# Patient Record
Sex: FEMALE | Race: BLACK OR AFRICAN AMERICAN | NOT HISPANIC OR LATINO | URBAN - METROPOLITAN AREA
[De-identification: names, ages, dates, MRNs, and addresses within clinical notes are randomized per-mention and may not be internally consistent; named-entity substitution may affect disease eponyms.]

---

## 2021-05-26 ENCOUNTER — TELEPHONE (OUTPATIENT)
Dept: BEHAVIORAL HEALTH | Facility: CLINIC | Age: 46
End: 2021-05-26

## 2021-05-26 NOTE — TELEPHONE ENCOUNTER
"Patient had filled out the PHQ-Q with a high score for suicidal thoughts on MyChart on 05/25/21 and this eval counselor had an appoientment for a gambling eval with her for 05/28/21.  Counselor reached out to patient to check on her suicidal ideation.  Patient reported having stolen from work last year, losing her job, losing her housing, being in a homeless shelter for months, and finally getting housing.  Patient has court approaching and her  recommended that she get into gambling treatment.  After talking to the patient, she is not actively suicidal, she would like to \"disappear\" yet reports many reasons to live including family, and hope for the future.  She has no prior record and has stolen an amount that she will be able to pay back as soon as she is able to maintain employment.  Discussion was held about going into inpatient and sober living.  She is worried she may lose her supported housing when they find out she has felony charges.  Patient and counselor talked about options and if she wanted to complete the eval earlier in the week, or wanted to have help getting directly into inpatient.  Patient stated she would check in in the morning.  This morning patient checked in stating that she will keep the appointment for Friday.  Patient was emailed information on [facility] and [facility] inpatient program.  Confirmed appointment for Friday and invited her to reach out if any questions or concerns surfaced before the appointment.    Emily Crowley MS, [facility], [facility]  "

## 2021-05-29 ENCOUNTER — HEALTH MAINTENANCE LETTER (OUTPATIENT)
Age: 46
End: 2021-05-29

## 2021-06-01 ENCOUNTER — TELEPHONE (OUTPATIENT)
Dept: BEHAVIORAL HEALTH | Facility: CLINIC | Age: 46
End: 2021-06-01

## 2021-06-01 NOTE — TELEPHONE ENCOUNTER
[facility] PHQ-9 Follow-up  Behavioral Health Clinician Triage Service    [employer] PHQ-9 Responses:  [facility] Follow-up to PHQ 5/25/2021 5/25/2021   PHQ-9 9. Suicide Ideation past 2 weeks More than half the days More than half the days   Thoughts of suicide or self harm in past 2 weeks - No   Thoughts of suicide or self harm in past 2 weeks No No   PHQ-9 Safety concerns? - No   PHQ-9 Safety concerns? No No        1st Outreach Date: June 1, 2021 Time: 250 pm  Outcome: Completed phone conversation / triage service.  See assessment and disposition below.  YasmeenDarrel)Keyla,[facility]     I m calling from [facility] to follow-up on a questionnaire you completed on [employer].      If it s ok I d like review a few of your symptoms/responses and a talk briefly  about how you have been doing to see if I might be able to provide some support and guidance.       Patient reports she is feeling better and is not experiencing suicidal thoughts. She also reports she was feeling  hopelessness rather than actually wanting to end her life. This was due to being unemployed. Patient is in contact with her therapist and has discussed this. She is also aware of safety resources. Patient reports canceling appointment with therapist after talking to her because of job interview and being hired. She has rescheduled this appointment for 6/3/2021.  She will let her therapist know if she experiences increased symptoms that are unmanageable.   FayePooja)Keyla,[facility]    Address/Location of patient: did not review  Have any supportive people near them? yes    Risk Assessment:  Patient has had a history of suicidal ideation: Patient reports history of passive thoughts  Patient denies current or recent suicidal ideation or behaviors.  Patient denies current or recent homicidal ideation or behaviors.  Patient denies current or recent self injurious behavior or ideation.  Patient denies other safety concerns.  Patient reports there are  no firearms in the house  Protective Factors Life Satisfaction, Reality testing ability and Positive coping skills   Risk Factors Current high stress    ASQ Assessment:  1. In the past few weeks, have you wished you were dead?  Yes: passive thoughts only - would not do anything to harm self. Just did not want to keep living her life  2. In the past few weeks, have you felt that you or your family would be better off if you         were dead?  Yes:  passive thoughts only - would not do anything to harm self. Just did not want to keep living her life

## 2021-06-02 ASSESSMENT — PATIENT HEALTH QUESTIONNAIRE - PHQ9
SUM OF ALL RESPONSES TO PHQ QUESTIONS 1-9: 12
10. IF YOU CHECKED OFF ANY PROBLEMS, HOW DIFFICULT HAVE THESE PROBLEMS MADE IT FOR YOU TO DO YOUR WORK, TAKE CARE OF THINGS AT HOME, OR GET ALONG WITH OTHER PEOPLE: SOMEWHAT DIFFICULT
SUM OF ALL RESPONSES TO PHQ QUESTIONS 1-9: 12

## 2021-06-03 ENCOUNTER — HOSPITAL ENCOUNTER (OUTPATIENT)
Dept: BEHAVIORAL HEALTH | Facility: CLINIC | Age: 46
Discharge: HOME | End: 2021-06-03
Attending: FAMILY MEDICINE | Admitting: FAMILY MEDICINE
Payer: MEDICAID

## 2021-06-03 PROCEDURE — 90791 PSYCH DIAGNOSTIC EVALUATION: CPT | Mod: GT | Performed by: COUNSELOR

## 2021-06-03 ASSESSMENT — ANXIETY QUESTIONNAIRES
4. TROUBLE RELAXING: MORE THAN HALF THE DAYS
7. FEELING AFRAID AS IF SOMETHING AWFUL MIGHT HAPPEN: NOT AT ALL
1. FEELING NERVOUS, ANXIOUS, OR ON EDGE: SEVERAL DAYS
5. BEING SO RESTLESS THAT IT IS HARD TO SIT STILL: SEVERAL DAYS
IF YOU CHECKED OFF ANY PROBLEMS ON THIS QUESTIONNAIRE, HOW DIFFICULT HAVE THESE PROBLEMS MADE IT FOR YOU TO DO YOUR WORK, TAKE CARE OF THINGS AT HOME, OR GET ALONG WITH OTHER PEOPLE: SOMEWHAT DIFFICULT
6. BECOMING EASILY ANNOYED OR IRRITABLE: SEVERAL DAYS
GAD7 TOTAL SCORE: 8
3. WORRYING TOO MUCH ABOUT DIFFERENT THINGS: SEVERAL DAYS
2. NOT BEING ABLE TO STOP OR CONTROL WORRYING: MORE THAN HALF THE DAYS

## 2021-06-03 NOTE — PROGRESS NOTES
"[facility] Services                                             SAFETY PLAN:  Step 1: Warning signs / cues (Thoughts, images, mood, situation, behavior) that a crisis may be developing:    Thoughts: I put myself down \"stupid\", \"not worth it\".    Thinking Processes: ruminations (can't stop thinking about my problems): stuck on past and racing thoughts    Mood: worsening depression    Behaviors: isolating/withdrawing , using drugs, using alcohol and not taking care of myself    Situations: legal issues: current legal problems   Step 2: Coping strategies - Things I can do to take my mind off of my problems without contacting another person (relaxation technique, physical activity):    Distress Tolerance Strategies:  Listen to music, read a book    Physical Activities: go for a walk    Focus on helpful thoughts:  \"This is temporary\", remind myself of what is important to me: my family and my future and that life is good right now.  Step 3: People and social settings that provide distraction:   Name: Abner Phone: [phone]   Name: Mom Phone: [phone]   Name: Sister Phone: [phone]    park, Islam and support group (i.e. twelve-step)   Step 4: Remind myself of people and things that are important to me and worth living for:  For myself, my family  Step 5: Professionals or agencies I can contact during a crisis:    Suicide Prevention Lifeline: 4-361-518-TALK (8373)    Crisis Text Line: Text [place] to 379013  [place] Helpline:  1-476.767.8269    Call 911 or go to my nearest emergency department.   I helped develop this safety plan and agree to use it when needed.  I have been given a copy of this plan.      Client signature ________Completed over the phone due to Telehealth_________________________________________________________  Today s date:  06/03/21  Adapted from Safety Plan Template 2008 Peggy Mcintosh and Parish Lee is reprinted with the express permission of the authors.  No portion of the Safety Plan " Template may be reproduced without the express, written permission.  You can contact the authors at [email or web address] or [email or web address].

## 2021-06-03 NOTE — PROGRESS NOTES
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 for the last 30 days (0-10, 0 being no urge at all)? 5    What has helped you reduce your urges to padilla?    Per patient \"thinking about my future, thinking about the help I am going to get, and help me to live a normal life.\"      Dimension V Ratings   Relapse/Continued Use/Continued problem potential - The placing authority must use the criteria in Dimension V to determine a client s relapse, continued use, and continued problem potential.   RISK DESCRIPTIONS - Severity ratin (A) Client has minimal recognition and understanding of relapse and recidivism issues and displays moderate vulnerability for further substance use or mental health problems. (B) Client has some coping skills inconsistently applied.    REASONS SEVERITY WAS ASSIGNED - (What information did the person provide that indicates his or her understanding of relapse issues? What about the person s experience indicates how prone he or she is to relapse? What coping skills does the person have that decrease relapse potential?)      Patient displays limited understanding of addiction and relapse potential.  Patient denies attending any Gamblers Anonymous (GA) groups in the past consistently.  Patient denies attending any problem gambling treatment in the " past.  Patient appears to lack knowledge of the addictions cycle, insight into their personal relapse process along with warning signs and triggers.  Patient appears to lack insight into the effects gambling has had on their physical, emotional and mental health.  Patient appears to lack impulse control, gambling free coping skills and long-term maintenance skills.  The patient appears to be at risk for relapsing in gambling behaviors if they do not seek treatment services at this time. Patient may have undiagnosed mental health, which increases risk for relapse.           DIMENSION VI - Recovery Environment   Are you currently working or in school? Please explain.     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     How has gambling affected your work?    Missed work days, lost housing and cars.    How would you describe your current financial status?  Some money problems    Are you are having problems with unpaid bills, bankruptcy, IRS problems, etc.?    Yes, please explain: IRS payment plan    What is your approximate present gambling debt?    Restitution $[id number]    Describe a typical week for you i.e. work, leisure activities, socializing, etc.     Work, no [employer], homebody.     What percentage of time do you padilla alone?  With others?     alone    Are you currently in a significant relationship?     No    Sexual Orientation:     Heterosexual    Who do you live with?      NA    Do you have any children?      No    How many times have you been ?    None    Describe your current support system i.e. family, friends, sponsor, therapist, etc.?      Mom, sibling, brother [place].    Do you have any past or present legal charges?    Yes, please explain: Patient is in front of [facility] at [address] for stealing from her place of employment.    Do you have any obstacles that would prevent you from participating in treatment?    No    Do you  "pray, meditate, do yoga, attend AA/NA/GA or other spiritual practices?    Yes, please explain: pray a lot, will find a Restoration in the community.    How does your spirituality impact your recovery?      Per patient \"plays a big part, help and guidance, keep me on the right path\".    Please list any other problems, issues or concerns that could affect your recovery if not addressed?      NA    Dimension VI Ratings   Recovery environment - The placing authority must use the criteria in Dimension VI to determine a client s recovery environment.   RISK DESCRIPTIONS - Severity rating: 3 Client is not engaged in structured, meaningful activity and the client's peers, family, significant other, and living environment are unsupportive, or there is significant criminal justice system involvement.    REASONS SEVERITY WAS ASSIGNED - (What support does the person have for making changes? What structure/stability does the person have in his or her daily life that will increase the likelihood that changes can be sustained? What problems exist in the person s environment that will jeopardize getting/staying clean and sober?)     The patient reports living in supported housing at the time of assessment.  Patient reports living environment is supportive of recovery efforts.  Patient reports not being in a romantic relationship at this time.  The patient does reports having relationship conflict with family due to continued gambling. Patient appears to have limited external activities, leisure time, friends, or social groups outside of gambling.  The patient appears to lack adequate support in the community through 12-step meetings or other recovery based interactions at this time and appears to lack additional supports familiar with recovery.  Patient is employed full time beginning 05/04/21. Patient  Has current legal involvement at this time.                 Summary of Gambling Disorder Symptoms     Needs to padilla with increasing " "amount of money in order to achieve desired excitement.  Is restlessness or irritable when attempting to cut down or stop gambling.  Has made repeated unsuccessful efforts to cut down or stop gambling.  Is often preoccupied with gambling (e.g. having persistent thoughts of reliving past gambling experiences, handicapping or planning the next venture, thinking of ways to get money with which to padilla).  Often gambles when feeling distressed (e.g. feelings of helplessness, guilt, anxiety, depression).  After losing money gambling, individual often returned another day to get even. (\"chasing one's losses\")  Lies to conceal the extent of involvement with gambling.  Has jeopardized or lost a significant relationship, job, educational or career opportunity because of gambling.  Relies on others to provide money to relieve desperate financial situations caused by gambling (\"a bailout\")    Specify if:   Episodic:  Meeting diagnostic at more than one time point, with symptoms subsiding between periods of gambling disorder for at least several months.    Persistent:  Experiencing continuous symptoms, to meet diagnostic criteria for multiple years.    Specify if:   In early remission:  After full criteria for alcohol/drug use disorder were previously met, none of the criteria for alcohol/drug use disorder have been met for at least 3 months but for less than 12 months (with the exception that Criterion A4,  Craving or a strong desire or urge to use alcohol/drug  may be met).     In sustained remission:   After full criteria for alcohol use disorder were previously met, none of the criteria for alcohol/drug use disorder have been met at any time during a period of 12 months or longer (with the exception that Criterion A4,  Craving or strong desire or urge to use alcohol/drug  may be met).   Specify if:   This additional specifier is used if the individual is in an environment where access to alcohol is restricted.    Mild: " Presence of 4-5 symptoms    Moderate: Presence of 6-7 symptoms    Severe: Presence of 8 or more symptoms      Summary of Substance Abuse Disorder Symptoms     A problematic pattern of alcohol/drug use leading to clinically significant impairment or distress, as manifested by at least two of the following, occurring within a 12-month period:    NA    Specify if:   In early remission:  After full criteria for alcohol/drug use disorder were previously met, none of the criteria for alcohol/drug use disorder have been met for at least 3 months but for less than 12 months (with the exception that Criterion A4,  Craving or a strong desire or urge to use alcohol/drug  may be met).     In sustained remission:   After full criteria for alcohol use disorder were previously met, none of the criteria for alcohol/drug use disorder have been met at any time during a period of 12 months or longer (with the exception that Criterion A4,  Craving or strong desire or urge to use alcohol/drug  may be met).   Specify if:   This additional specifier is used if the individual is in an environment where access to alcohol is restricted.    Mild: Presence of 2-3 symptoms    Moderate: Presence of 4-5 symptoms    Severe: Presence of 6 or more symptoms    SOGS: 17 DSM-5: 9 CAGE-AID: 0 ADIEL-7: 8 PHQ-9: 16     Mental Status Assessment    Physical Appearance/Attire:  Appears stated age   Hygiene:  Comment: Started as a video eval, went to telephone unable to assess.  Eye Contact:  at examiner  Speech:  regular  Speech Volume:  regular  Speech Quality: fluid  Cognitive/Perceptual:  reality based  Cognition:  memory intact   Judgment:  intact  Insight:  intact  Orientation:  time, place, person and situation  Thought:  logical   Hallucinations:  none  General Behavioral Tone:  cooperative  Psychomotor Activity:  Started as a video eval, went to telephone unable to assess.  Gait:  Started as a video eval, went to telephone unable to assess.  Mood:   appropriate, sad, depressed and staff assessment of mood congruent with patient report  Affect:  congruence/appropriate      Vulnerable Adult Checklist for OUTPATIENTS     1.  Do you have a physical, emotional or mental infirmity or dysfunction?       No    2.  Does this issue impair your ability to provide for your own care without help, including providing yourself with food, shelter, clothing, healthcare or supervision?       No    3.  Because of this issue, I need assistance to protect myself from maltreatment by others.      No    Based on the above information:    This person is not a functional Vulnerable Adult according to [place] Statute 626.5572 subdivision 21.      Category Severity (ICD-10 Code / DSM 5 Code)   Gambling Disorder Severe  (F63.0) (312.31)     Lamar-Suicide Severity Rating Scale   Suicide Ideation   1.) Have you ever wished you were dead or that you could go to sleep and not wake up?     Lifetime:  Yes Past Month:  Yes   2.) Have you actually had any thoughts of killing yourself?   Lifetime:  Yes Past Month:  Yes   3.) Have you been thinking about how you might do this?     Lifetime:  Yes, Describe: believes it is a sin, looks to the future, her mom and her family.  Walking out into traffic. Past Month:  No   4.) Have you had these thoughts and had some intention of acting on them?     Lifetime:  No Past Month:  No   5.) Have you started to work out the details of how to kill yourself?   Lifetime:  Yes, Describe: walking out in traffic Past Month:  No   6.) Do you intend to carry out this plan?      Lifetime:  No Past Month:  No   Intensity of Ideation   Intensity of ideation (1 being least severe, 5 being most severe):     Lifetime:  4, when she first lost her job and was facing court.  Past Month:  NA   How often do you have these thoughts?  Less than once a week      When you have the thoughts how long do they last?  Fleeting - few seconds or minutes   Can you stop thinking about  killing yourself or wanting to die if you want to?  Yes, easily able to control thoughts   Are there things - anyone or anything (i.e. family, Hoahaoism, pain of death) that stopped you from wanting to die or acting on thoughts of suicide?  Protective factors definitely stopped you from attempting suicide   What sort of reasons did you have for thinking about wanting to die or killing yourself (ie end pain, stop how you were feeling, get attention or reaction, revenge)?  Completely to end or stop the pain (you couldn't go on living the way you were feeling)   Suicidal Behavior   (Suicide Attempt) - Have you made a suicide attempt?     Lifetime:  No Past Month:  No   Have you engaged in self-harm (non-suicidal self-injury)?  No   (Interrupted Attempt) - Has there been a time when you started to do something to end your life but someone or something stopped you before you actually did anything?  No   (Aborted or Self-Interrupted Attempt) - Has there been a time when you started to do something to try to end your life but you stopped yourself before you actually did anything?  No   (Preparatory Acts of Behavior) - Have you taken any steps towards making suicide attempt or preparing to kill yourself (such as collecting pills, getting a gun, giving valuables away or writing a suicide note)?  No   Actual Lethality/Medical Damage:  NA     2008  [employer]  Used with permission by Zoe Tamayo, PhD.       Guide to C-SSRS Risk Ratings   NO IDEATION:  with no active thoughts IDEATION: with a wish to die. IDEATION: with active thoughts. Risk Ratings   If Yes No No 0 - Very Low Risk   If NA Yes No 1 - Low Risk   If NA Yes Yes 2 - Low/moderate risk   IDEATION: associated thoughts of methods without intent or plan INTENT: Intent to follow through on suicide PLAN: Plan to follow through on suicide Risk Ratings cont...   If Yes No No 3 - Moderate Risk   If Yes Yes No 4 - High Risk   If Yes Yes  "Yes 5 - High Risk   The patient's ADDITIONAL RISK FACTORS and lack of PROTECTIVE FACTORS may increase their overall suicide risk ratings.     Additional Risk Factors:    No additional risk factors   Protective Factors:    Having people in his/her life that would prevent the patient from considering a suicide attempt (i.e. young children, spouse, parents, etc.)     An absence of mental health issues or stable and well treated mental health issues     An absence of chronic health problems or stable and well treated chronic health issues     Having easy access to supportive family members     Having cultural, Sabianism or spiritual beliefs that discourage suicide     Having restricted access to highly lethal means of suicide     Risk Status   0. - Very Low Risk:  Evaluation Counselors:  Document in Epic / SBAR to counselor \"Very Low Risk\".      Treatment Counselors:  Reassess upon admission as applicable, assess weekly in progress notes under Dimension 3 and summarize in Discharge / Treatment summary under Dimension 3.   Additional information to support suicide risk rating: There was no additional information to provide at this time.     Summary of ASAM Placement Criteria:   I.) Intoxication and Withdrawal: 0   II.) Biomedical:  0   III.) Emotional and Behavioral:  2   IV.) Readiness to Change:  1   V.) Relapse Potential: 2   VI.) Recovery Environmental: 3     Evaluation Summary and Plan   's Recommendation    Abstain from all forms of gambling, including \"free\" games , and online/terrence games.  Refrain from entering all types of liv establishments.  Self-ban with the help of a trusted other from all local casinos/card rooms, cut up players cards and have all gambling mail stopped.  Abstain from all mood-altering chemicals unless prescribed by a licensed provider.  Complete the evening outpatient compulsive gambling treatment program at [facility].   Complete Orientation and begin group on " 06/08/21.  Attend GA 12-step, cultural, spiritual, other supportive community meeting on a weekly basis.   Have someone you check in with weekly who will hold you accountable.   Remain law abiding and follow all recommendations of the courts/PO.  Limit, if not abstain from alcohol and all other non-prescribed mood altering chemicals  Once insured complete all medical and dental appointments, consider meeting with a therapist for possible medication and ongoing therapy to talk about past childhood trauma.    At the beginning of the assessment patient was offered information on inpatient treatment, other outpatient options, individual counselors, and [facility].  Patient chose to attend [facility] as it worked for their schedule and has a family component.   has a financial interest in patient attending the [facility] program.  's clinical opinion is that the patient would benefit from group therapy.  Patient was also referred to inpatient at [facility] and will be considering attending in the future if therapeutically necessary.    Initial problem list:    The patient lacks relapse prevention skills  The patient has poor coping skills  The patient has poor refusal skills   The patient lacks a sober peer support network  The patient has a tendency to isolate  The patient has a significant history of trauma and/or abuse issues  The patient has current legal issues    Strengths:  Family support  Stable Employment  Stable housing  Connection to social and/or recovery support  Intelligent  Functional communication skills      Answers for HPI/ROS submitted by the patient on 6/2/2021   If you checked off any problems, how difficult have these problems made it for you to do your work, take care of things at home, or get along with other people?: Somewhat difficult  PHQ9 TOTAL SCORE: 12    Emily Crowley, MS, LADC, ICGC-II

## 2021-06-04 ASSESSMENT — ANXIETY QUESTIONNAIRES: GAD7 TOTAL SCORE: 8

## 2021-06-08 PROBLEM — Z72.6 GAMBLING PROBLEM: Status: ACTIVE | Noted: 2021-06-08

## 2021-06-10 ENCOUNTER — HOSPITAL ENCOUNTER (OUTPATIENT)
Dept: BEHAVIORAL HEALTH | Facility: CLINIC | Age: 46
End: 2021-06-10
Attending: FAMILY MEDICINE
Payer: MEDICAID

## 2021-06-10 PROCEDURE — 90832 PSYTX W PT 30 MINUTES: CPT | Mod: GT

## 2021-06-10 PROCEDURE — 90853 GROUP PSYCHOTHERAPY: CPT | Mod: GT

## 2021-06-11 NOTE — PROGRESS NOTES
Individual counseling session/ group orientation:    Telemedicine Visit: The patient's condition can be safely assessed and treated via synchronous audio and visual telemedicine encounter.      Reason for Telemedicine Visit: Services only offered telehealth    Originating Site (Patient Location): Patient's home    Distant Site (Provider Location): Provider Remote Setting    Consent:  The patient/guardian has verbally consented to: the potential risks and benefits of telemedicine (video visit) versus in person care; bill my insurance or make self-payment for services provided; and responsibility for payment of non-covered services.     Mode of Communication:  Video Conference via [employer]    As the provider I attest to compliance with applicable laws and regulations related to telemedicine.    Video visit start time: 4:12 PM  Video visit end time: 4:47 PM    Time: 35 minutes      D.  Patient attended Problem Gambling OP orientation.  Patient had received their assignment notebook in the mail, folder was gone through and discussed, patient agreed verbally to the Admission Orientation Checklist, due to Covid, not signed in person but gave verbal agreement.  PT reported wanting to try outpatient and may consider inpatient treatment at [facility] in the future.  PT denied any suicide and/or self harm ideation, plans or attempts at this time.  PT created safety plan during evaluation, PT was emailed copy of safety plan.  PT reported feeling safe.  PT will get information for sister and talk with mom for family group. PT was also sent information on in-person session, gamban and individual treatment providers and PT identified wanting to gain a therapist for individual session. PT scheduled individual treatment planning session with writer for Miguelina 15 @ 1:00 PM.  I: Counselor facilitated 1:1 orientation session.  A. Pt understood orientation and motivated to begin treatment.    P.  Begin attending group and meet  with counselor for individual treatment planning session.    Kristi Camarillo, VANDANA, CGC

## 2021-06-11 NOTE — PROGRESS NOTES
Group Therapy Documentation     Was the patient seen in-person or via Telemedicine (if in-person skip to Group Note): Telemedicine    If Telemedicine: The patient's condition can be safely assessed and treated via synchronous audio and visual telemedicine encounter. ? ?      Reason for Telemedicine Visit:? Due to Covid, only offering telehealth at this time.    Originating Site (Patient Location):  Home    Distant Site (Provider Location): Counselor off site    Consent: ?The patient/guardian has verbally consented to: the potential risks and benefits of telemedicine (video visit) versus in person care; bill my insurance or make self-payment for services provided; and responsibility for payment of non-covered services.       Mode of Communication:??Video Conference via Zoom      As the provider I attest to compliance with applicable laws and regulations related to telemedicine.        Patient attended a video group session on the following days: ?          GROUP NOTE:  DATE OF SERVICE:  Miguelina 10, 2021    START TIME:  5:30pm    END TIME:  7:31 PM    FACILITATOR(S):    [place]DA[place] [facility] [place]IC: BEH Group Therapy, Problem Gambling Group     Number of patients attending the group: 11    Group Length:   121 minutes    Group Therapy Type:  Problem Gambling Group    Group Attendance:  Client attended group     Summary of Group / Topics Discussed: Les RAI From phase III peer led group joined and shared aftercare option with phase III.  PT's asked questions from Les regarding programming and recovery.  Group followed up with group introductions, with 2 new members of the group, and group members welcomed new members to the group.  Group discussed various aspects of recovery, spirituality, trust, honesty, family and friends in discussion of how their addiction and recovery has impacted these and how they would like to implement them into their recovery.     Patient's response to the group  topic/interactions:  PT appeared to gain information and interest in Phase III.  PT also appeared to gain insight into reimplementing important aspects of their lives into recovery.      Client specific details:   PT attended her first group and asked questions of Phase III presenter and introduced herself to the group.  PT shared she is facing felony charges for stealing.  PT participated in group discussion, sharing how gambling has impacted her relationships with both friends and family.  PT was an active group member.

## 2021-06-15 ENCOUNTER — HOSPITAL ENCOUNTER (OUTPATIENT)
Dept: BEHAVIORAL HEALTH | Facility: CLINIC | Age: 46
End: 2021-06-15
Attending: FAMILY MEDICINE
Payer: MEDICAID

## 2021-06-15 PROCEDURE — 90837 PSYTX W PT 60 MINUTES: CPT | Mod: GT

## 2021-06-15 NOTE — PROGRESS NOTES
Individual counseling session:    Telemedicine Visit: The patient's condition can be safely assessed and treated via synchronous audio and visual telemedicine encounter.      Reason for Telemedicine Visit: Services only offered telehealth    Originating Site (Patient Location): Patient's home    Distant Site (Provider Location): Provider Remote Setting    Consent:  The patient/guardian has verbally consented to: the potential risks and benefits of telemedicine (video visit) versus in person care; bill my insurance or make self-payment for services provided; and responsibility for payment of non-covered services.     Mode of Communication:  Video Conference via [employer]    As the provider I attest to compliance with applicable laws and regulations related to telemedicine.    Video visit start time: 1:00 pm  Video visit end time: 1:55 PM    D) Pt met with Problem Gambling counseling staff via video conference to create treatment plan. PT reported feeling motivated for recovery efforts.  PT has agreed to gain a mental health therapist for ongoing support and was sent list of treatment providers.  PT denied any suicidal ideations, plan or attempts, reporting feeling better and motivated.  PT will talk to family members about attending family programming.    Client Treatment goal list:  1. Have the ability to identify and control triggers and urges to padilla.  2. Develop copings skills  3. Rebuild relationships with family and friends.    I: Counselor facilitated 1:1 treatment planning session  A: Pt agreed to treatment plan and appeared motivated to begin working on problems identified and discussed. Treatment goals include all Dimensions. Pt understood orientation and motivated to begin treatment.   Patient agreed verbally to the Admission Orientation Checklist, due to Covid, not signed in person but gave verbal agreement.   P) Pt to continue in group and begin working on assignments.  Counselor will send  additional homework assignments as indicated in PT treatment plan.  PT is scheduled for individual counseling session with writer on Tuesday, June 22 @ 10:00 AM.

## 2021-06-16 ENCOUNTER — HOSPITAL ENCOUNTER (OUTPATIENT)
Dept: BEHAVIORAL HEALTH | Facility: CLINIC | Age: 46
End: 2021-06-16
Attending: FAMILY MEDICINE
Payer: MEDICAID

## 2021-06-16 PROCEDURE — 90853 GROUP PSYCHOTHERAPY: CPT | Mod: GT

## 2021-06-17 ENCOUNTER — HOSPITAL ENCOUNTER (OUTPATIENT)
Dept: BEHAVIORAL HEALTH | Facility: CLINIC | Age: 46
End: 2021-06-17
Attending: FAMILY MEDICINE
Payer: MEDICAID

## 2021-06-17 PROCEDURE — 90853 GROUP PSYCHOTHERAPY: CPT | Mod: GT

## 2021-06-17 NOTE — PROGRESS NOTES
Family  Group Documentation     Was the patient seen in-person or via Telemedicine (if in-person skip to Group Note): Telemedicine    If Telemedicine: The patient's condition can be safely assessed and treated via synchronous audio and visual telemedicine encounter. ? ?      Reason for Telemedicine Visit:? Group only offered via telemedicine.    Originating Site (Patient Location):  Home    Distant Site (Provider Location): [facility]      Consent: ?The patient/guardian has verbally consented to: the potential risks and benefits of telemedicine (video visit) versus in person care; bill my insurance or make self-payment for services provided; and responsibility for payment of non-covered services.       Mode of Communication:??Video Conference via Zoom      As the provider I attest to compliance with applicable laws and regulations related to telemedicine.        Patient attended a video group session on the following days: ?          GROUP NOTE:  DATE OF SERVICE:  June 16, 2021    START TIME:  5:30 PM    END TIME:  7:07 PM    FACILITATOR(S):  VANDANA Lord, [facility] [place]    TOPIC: BEH Group Therapy, Problem Gambling Group - Family    Number of patients attending the group: 7      In-person: 0      Video Conference: 7  2 family members were also in attendance via video conference.  One PT joined group at 6:30 PM.    Group Length:   97 minutes    Group Therapy Type:  Problem Gambling Family Group    Group Attendance:  Client attended group     Summary of Group / Topics Discussed: Group checked in with introductions discussed various aspects of healthy and unhealthy relationships with discussions on characteristics on codependency, enabling behaviors within addiction, in addition to identifying and building healthy boundaries.  Boundaries included emotional, spiritual, financial and physical.  Group members shared their experiences the various topics discussed.     Patient's response to  the group topic/interactions:  PT appeared to gain insight into understanding codependency and enabling behaviors, as well as       Client specific details:  PT shared she is feeling accomplished as she had court today.  PT shared in group discussion and is an active participant in group and treatment processes.

## 2021-06-18 NOTE — PROGRESS NOTES
Group Therapy Documentation      Was the patient seen in-person or via Telemedicine (if in-person skip to Group Note): Telemedicine     If Telemedicine: The patient's condition can be safely assessed and treated via synchronous audio and visual telemedicine encounter. ? ?       Reason for Telemedicine Visit:? Due to Covid, only offering telehealth at this time.     Originating Site (Patient Location):  Home     Distant Site (Provider Location): Counselor off site     Consent: ?The patient/guardian has verbally consented to: the potential risks and benefits of telemedicine (video visit) versus in person care; bill my insurance or make self-payment for services provided; and responsibility for payment of non-covered services.        Mode of Communication:??Video Conference via Zoom      As the provider I attest to compliance with applicable laws and regulations related to telemedicine.        Patient attended a video group session on the following days: ?           GROUP NOTE:  DATE OF SERVICE:  June 17, 2021     START TIME:  5:30pm     END TIME:  7:29 PM     FACILITATOR(S):    VANDANA Lord, [facility] [place]     TOPIC: BEH Group Therapy, Problem Gambling Group      Number of patients attending the group: 10     Group Length:   119 minutes     Group Therapy Type:  Problem Gambling Group     Group Attendance:  Client attended group      Summary of Group / Topics Discussed: Group checked in with how their week has been and efforts towards their recovery.  Counselor processed leave of absence from counseling staff and offered support and addressed questions and concerns.  Counselor also discussed option of Thursday attending in-person sessions.  Group discussed various recovery related topics such as self esteem, self worth, connection, family, feelings in early recovery, urges to padilla and cross addiction.  Also group discussed their struggles and accomplishments in recovery.     Patient's response to the group  topic/interactions:    PT shared their personal experience with the topics, adding to the discussion.       Client specific details:  PT shared her excitement with her brother, sister and mother all willing to participate in family programming.  PT contributed to group discussion.

## 2021-06-23 ENCOUNTER — HOSPITAL ENCOUNTER (OUTPATIENT)
Dept: BEHAVIORAL HEALTH | Facility: CLINIC | Age: 46
End: 2021-06-23
Attending: FAMILY MEDICINE
Payer: MEDICAID

## 2021-06-23 PROCEDURE — 90853 GROUP PSYCHOTHERAPY: CPT | Mod: GT

## 2021-06-23 PROCEDURE — 90837 PSYTX W PT 60 MINUTES: CPT | Mod: GT

## 2021-06-23 NOTE — PROGRESS NOTES
Individual counseling session:    Telemedicine Visit: The patient's condition can be safely assessed and treated via synchronous audio and visual telemedicine encounter.      Reason for Telemedicine Visit: Services only offered telehealth    Originating Site (Patient Location): Patient's home    Distant Site (Provider Location): Provider Remote Setting    Consent:  The patient/guardian has verbally consented to: the potential risks and benefits of telemedicine (video visit) versus in person care; bill my insurance or make self-payment for services provided; and responsibility for payment of non-covered services.     Mode of Communication:  Video Conference via [employer]    As the provider I attest to compliance with applicable laws and regulations related to telemedicine.    Video visit start time: 1:00 PM  Video visit end time: 2:06 PM    Time:  66 minutes    D: PT met with writer via video conference for scheduled individual counseling session.  PT reported experiencing urges over the weekend, reporting she was able to identify boredom.  PT reports her sister is currently in town on a work assignment, reporting she will begin spending additional time with her.  PT and counselor discussed codependency from group topic as PT reports this is something she would like to work on.  PT reported her sister would be going to [employer] with her this weekend for self exclusion.  PT reported attending court, reporting 3 years probation with a stay of judication.  PT will send writer probation information as treatment is indicated as a condition.  PT shared feelings of self doubt and judgement and reports she will continue to identify positive affirmations, identify areas she is proud of herself and begin journlling.  PT also reported she is going to work towards increasing her chavez. PT reported feeling positive in her recovery and reports she continues to build on self confidence to be more active in group sessions.  PT  also reported identifying being honest has been a positive experience in recovery.  PT signed INESSA's for her mother, sister and brother and will talk to them about attending family group next week.  I: Counselor facilitated 1:1 session.  A: PT appears engaged and motivated in recovery efforts.  P: Begin jouralling, attend group and begin working on homework assignments as indicated in treatment plan.

## 2021-06-24 NOTE — PROGRESS NOTES
"Family  Group Documentation      Was the patient seen in-person or via Telemedicine (if in-person skip to Group Note): Telemedicine     If Telemedicine: The patient's condition can be safely assessed and treated via synchronous audio and visual telemedicine encounter. ? ?       Reason for Telemedicine Visit:? Group only offered via telemedicine.     Originating Site (Patient Location):  Home     Distant Site (Provider Location): [facility]       Consent: ?The patient/guardian has verbally consented to: the potential risks and benefits of telemedicine (video visit) versus in person care; bill my insurance or make self-payment for services provided; and responsibility for payment of non-covered services.        Mode of Communication:??Video Conference via Zoom      As the provider I attest to compliance with applicable laws and regulations related to telemedicine.        Patient attended a video group session on the following days: ?           GROUP NOTE:  DATE OF SERVICE:  June 23, 2021     START TIME:  5:30 PM     END TIME:  7:18 PM     FACILITATOR(S):  VANDANA Lord, [facility] [place]     TOPIC: BEH Group Therapy, Problem Gambling Group - Family     Number of patients attending the group: 7      In-person: 0      Video Conference: 7  2 family members were also in attendance via video conference.     Group Length:   108 minutes     Group Therapy Type:  Problem Gambling Family Group     Group Attendance:  Client attended group      Summary of Group / Topics Discussed: Group checked in with introductions of group members and family.  Group discussed effective and relationships using Harsh Zapien's evidenced based theory, \"The Seven Principles for Making Marriage Work\" and applied these concepts to familial relationships, including siblings, parents, partners and other close family members.  Writer discussed negative communication patterns and overcoming them, in addition to aspects that make " up healthy relationships and connection with family based on the book.     Patient's response to the group topic/interactions:  PT appeared to gain insight into aspects of connection and characteristics of a healthy relationship.      Client specific details: PT checked in and opened up to the group sharing her feelings being in a group for the first time and not having the group and recovery experience as others in the group.  Peers offered support and their experiences with being in group for the first time.  PT shared she no longer felt alone.  PT participated in group discussion.

## 2021-06-29 ENCOUNTER — HOSPITAL ENCOUNTER (OUTPATIENT)
Dept: BEHAVIORAL HEALTH | Facility: CLINIC | Age: 46
End: 2021-06-29
Attending: FAMILY MEDICINE
Payer: MEDICAID

## 2021-06-29 PROCEDURE — 90837 PSYTX W PT 60 MINUTES: CPT | Mod: GT

## 2021-06-29 PROCEDURE — 90853 GROUP PSYCHOTHERAPY: CPT | Mod: GT | Performed by: COUNSELOR

## 2021-06-29 NOTE — PROGRESS NOTES
Individual counseling session:    Telemedicine Visit: The patient's condition can be safely assessed and treated via synchronous audio and visual telemedicine encounter.      Reason for Telemedicine Visit: Services only offered telehealth    Originating Site (Patient Location): Patient's home    Distant Site (Provider Location): Provider Remote Setting    Consent:  The patient/guardian has verbally consented to: the potential risks and benefits of telemedicine (video visit) versus in person care; bill my insurance or make self-payment for services provided; and responsibility for payment of non-covered services.     Mode of Communication:  Video Conference via [employer]    As the provider I attest to compliance with applicable laws and regulations related to telemedicine.    Counselor verified Patient with 2-point verification: PT is known to provider.    Video visit start time: 10:05 AM  Video visit end time: 11:09 AM    Length of session: 64 minutes    D: PT met with writer for scheduled individual counseling session.  PT reported spending time with her sister and learning how supportive her family members are for her.  PT reported she and her sister went to [employer] and self banned herself.  PT reported feeling empower.  PT reported she is building relationships with her family at this time and they received invite from this writer and will be attending family group on Wed.  PT also reports working with medical clinicians regarding sciatic and leg pain, reporting physical therapy beginning next week.  PT reported possible surgery for a lump on her back and will follow up with additional appointments next week.  PT and counselor discussed additional supports, including GA and attending inperson group to build on connections.  PT reported she will consider these options as she verbalizes they will support her recovery.  PT reported breaking up with a long term partner, reporting this as an unhealthy,  unsupportive relationship.  PT shared her feelings openly.  PT identified feelings of anger and frustration as well as boredom as triggers to padilla.  PT reported reaching out to her sister this weekend when she experienced urges.  PT denies any gambling.  PT reported wanting to make a strong effort to build self esteem and self worth in recovery.  PT and counselor also discussed leisure hobbies/activites due to PT currently not working.  PT reports she has seen some arts and crafts at the store and may explore engaging.  PT reported she would like to be more courageous and assertiveness in her recovery reporting having positivity and hope for her recovery and future.     I: Counselor facilitated 1:1 session.  A: PT appears motivated and engaged in her recovery.  P: PT will consider attending GA meetings (either virtually or in-person if transportation permits) and consider attending in person group sessions.  PT will also work to identify leisure activities being mindful of physical abilities.      PT is scheduled for 1:1 counseling session next week, Wed, July 7, 2021 @ 2:30.

## 2021-06-30 ENCOUNTER — HOSPITAL ENCOUNTER (OUTPATIENT)
Dept: BEHAVIORAL HEALTH | Facility: CLINIC | Age: 46
End: 2021-06-30
Attending: FAMILY MEDICINE
Payer: MEDICAID

## 2021-06-30 PROCEDURE — 90853 GROUP PSYCHOTHERAPY: CPT | Mod: GT

## 2021-06-30 NOTE — PROGRESS NOTES
Group Therapy Documentation      Was the patient seen in-person or via Telemedicine (if in-person skip to Group Note): Telemedicine     If Telemedicine: The patient's condition can be safely assessed and treated via synchronous audio and visual telemedicine encounter. ? ?       Reason for Telemedicine Visit:? Due to Covid, only offering telehealth at this time.     Originating Site (Patient Location):  Home     Distant Site (Provider Location): Counselor off site     Consent: ?The patient/guardian has verbally consented to: the potential risks and benefits of telemedicine (video visit) versus in person care; bill my insurance or make self-payment for services provided; and responsibility for payment of non-covered services.        Mode of Communication:??Video Conference via Zoom      As the provider I attest to compliance with applicable laws and regulations related to telemedicine.        Patient attended a video group session on the following days: ?           GROUP NOTE:  DATE OF SERVICE:  June 29, 2021     START TIME:  5:30pm     END TIME:  7:30pm     FACILITATOR(S):    Emily Crowley MS, VANDANA, [facility]     TOPIC: BEH Group Therapy, Problem Gambling Group      Number of patients attending the group: 13, one patient left after 1/2 hour and wasn't billed.     Group Length:   120 mins     Group Therapy Type:  Problem Gambling Group     Group Attendance:  Client attended group      Summary of Group / Topics Discussed: Group checked in after a three week unplanned absence of counseling staff. Patient shared how they were doing and what had been going on in the past weeks.  Many patient's mentioned stress, additional stressors in their life, or just constant stress.  After check in discussion was held on Meditation, free meditation apps, finding flow, mindfulness, coloring, and the basics of CBT. Patient's were asked to share one thing they do to de stress.  Group ended with a [employer] reading on Perceptiveness.       Patient's response to the group topic/interactions:  Patient was attentive in group and shared how they were doing during their check in.       Client specific details:   Patient shared that she feels really good about her life right now, being in group, and that she is being grateful for all that has gone well this year.  She stated she walks and listens to [employer] music to de-stress.

## 2021-07-01 ENCOUNTER — HOSPITAL ENCOUNTER (OUTPATIENT)
Dept: BEHAVIORAL HEALTH | Facility: CLINIC | Age: 46
End: 2021-07-01
Attending: FAMILY MEDICINE
Payer: COMMERCIAL

## 2021-07-01 PROCEDURE — 90853 GROUP PSYCHOTHERAPY: CPT | Mod: GT

## 2021-07-01 NOTE — PROGRESS NOTES
Family  Group Documentation     Was the patient seen in-person or via Telemedicine (if in-person skip to Group Note): Telemedicine    If Telemedicine: The patient's condition can be safely assessed and treated via synchronous audio and visual telemedicine encounter. ? ?      Reason for Telemedicine Visit:? Group only offered via telemedicine.    Originating Site (Patient Location):  Home    Distant Site (Provider Location): [facility]      Consent: ?The patient/guardian has verbally consented to: the potential risks and benefits of telemedicine (video visit) versus in person care; bill my insurance or make self-payment for services provided; and responsibility for payment of non-covered services.       Mode of Communication:??Video Conference via Zoom      As the provider I attest to compliance with applicable laws and regulations related to telemedicine.        Patient attended a video group session on the following days: ?          GROUP NOTE:  DATE OF SERVICE:  June 30, 2021    START TIME:  5:30 PM    END TIME:  7:13 PM    FACILITATOR(S):  VANDANA Lord, [facility]    TOPIC: BEH Group Therapy, Problem Gambling Group - Family    Number of patients attending the group: 4      In-person: 0      Video Conference: 4  1 family members were also in attendance via video conference.     Group Length:   103 minutes    Group Therapy Type:  Problem Gambling Family Group    Group Attendance:  Client attended group     Summary of Group / Topics Discussed: Group checked in with introductions.  Group discussion was held on the biology of change, in addition, identifying the underlying feelings associated, including how to re-wire neurons in our brain with healthy thoughts, patterns and actions.  At the end of group, new group member introduced himself and was welcomed by group members.      Patient's response to the group topic/interactions:  PT appeared to gain insight into change and how  implementing healthy thoughts and behaviors into recovery, including implementing positivity.      Client specific details: PT shared and participated in group discussion.

## 2021-07-02 NOTE — PROGRESS NOTES
Group Therapy Documentation     Was the patient seen in-person or via Telemedicine (if in-person skip to Group Note): Telemedicine    If Telemedicine: The patient's condition can be safely assessed and treated via synchronous audio and visual telemedicine encounter. ? ?      Reason for Telemedicine Visit:? Due to Covid, only offering telehealth at this time.    Originating Site (Patient Location):  Home    Distant Site (Provider Location): Counselor off site    Consent: ?The patient/guardian has verbally consented to: the potential risks and benefits of telemedicine (video visit) versus in person care; bill my insurance or make self-payment for services provided; and responsibility for payment of non-covered services.       Mode of Communication:??Video Conference via Zoom      As the provider I attest to compliance with applicable laws and regulations related to telemedicine.        Patient attended a video group session on the following days: ?          GROUP NOTE:  DATE OF SERVICE:  July 1, 2021    START TIME:  5:30pm    END TIME:  7:28 PM    FACILITATOR(S):   VANDANA [place], [facility] [place]    TOPIC: BEH Group Therapy, Problem Gambling Group     Number of patients attending the group: 14    Group Length:   118 minutes    Group Therapy Type:  Problem Gambling Group    Group Attendance:  Client attended group     Summary of Group / Topics Discussed: Group began with check in and discussed the 4th of July weekend as possible trigger.  Group discussed experiencing/trying new things in the past 2 weeks, sharing if they had.  Discussion we held on distorted thinking patterns such as all-or-nothing thinking, judging, Emotional Reasoning, Overgeneralizing, and discounting the negative and blaming.  PT's shared their experiences and those they identified with.  One PT shared her experience of challenging her own negative thoughts and replacing them with positive things using Cognitive techniques.  Counselor discussed  change and how to 'rewire' our brains to develop healthy thinking patterns, in addition to self compassion.  PT's encouraged to begin acknowledging their own personal cognitive distortions/negative thinking patterns and challenging these beliefs.     Patient's response to the group topic/interactions:  PT appeared to gain knowledge of negative thinking patterns and gain insight into personal negative thinking patterns.      Client specific details:  PT shared she is excited her family will be joining family group next week.
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
## 2021-07-02 NOTE — ADDENDUM NOTE
Encounter addended by: Kristi [facility]illo [facility] on: 7/2/2021 8:34 AM   Actions taken: Clinical Note Signed

## 2021-07-06 ENCOUNTER — TELEPHONE (OUTPATIENT)
Dept: BEHAVIORAL HEALTH | Facility: CLINIC | Age: 46
End: 2021-07-06

## 2021-07-06 NOTE — TELEPHONE ENCOUNTER
Patient emailed to say she would not be at group this evening due to running errands with her sister before her mother comes into town.  She said she will be in group the rest of the week.     Emily Crowley MS, LADC, ICGC-II

## 2021-07-07 ENCOUNTER — HOSPITAL ENCOUNTER (OUTPATIENT)
Dept: BEHAVIORAL HEALTH | Facility: CLINIC | Age: 46
End: 2021-07-07
Attending: FAMILY MEDICINE
Payer: COMMERCIAL

## 2021-07-07 PROCEDURE — 90853 GROUP PSYCHOTHERAPY: CPT | Mod: GT

## 2021-07-07 PROCEDURE — 90837 PSYTX W PT 60 MINUTES: CPT | Mod: GT

## 2021-07-07 NOTE — PROGRESS NOTES
Individual counseling session:    Telemedicine Visit: The patient's condition can be safely assessed and treated via synchronous audio and visual telemedicine encounter.      Reason for Telemedicine Visit: Services only offered telehealth    Originating Site (Patient Location): Patient's home    Distant Site (Provider Location): Provider Remote Setting    Consent:  The patient/guardian has verbally consented to: the potential risks and benefits of telemedicine (video visit) versus in person care; bill my insurance or make self-payment for services provided; and responsibility for payment of non-covered services.     Mode of Communication:  Video Conference via [employer]    As the provider I attest to compliance with applicable laws and regulations related to telemedicine.    Patient is known to provider.    Video visit start time: 2:30 PM  Video visit end time: 3:34 PM    Length of session: 64 minutes    D: Writer met with PT for scheduled individual counseling session.  PT reported increased anxiety do to her mom visiting from out of town for 11 days and will be staying in PT's home.  PT reports she hasn't seen her mother in about a year.  Counselor and PT discussed strategies to manage increased emotions and implement self care, including journalling, taking time outs to read, walk, yoga, breathing exorcises and/or light exercise being mindful of physical limitations due to leg pain.  PT reported seeing her family and building relationships has been positive and she reports her family as supportive of her recovery.  PT reports she is also working to be more assertive with her emotions and sharing them with her family members.  PT reported spending quality time with her sister as she is in town on a work assignment. PT reported she continues to change her negative thinking patterns and has begun to journal positive affirmations also.  PT reports she has become aware of her increased positivity towards self.  I:  Counselor facilitated 1:1 session.  A: PT appears engaged and motivated in her recovery.  P: PT to continue journalling emotions, positive affirmations, and continue to work towards treatment plan goals.

## 2021-07-08 NOTE — PROGRESS NOTES
Family  Group Documentation     Was the patient seen in-person or via Telemedicine (if in-person skip to Group Note): Telemedicine    If Telemedicine: The patient's condition can be safely assessed and treated via synchronous audio and visual telemedicine encounter. ? ?      Reason for Telemedicine Visit:? Group only offered via telemedicine.    Originating Site (Patient Location):  Home    Distant Site (Provider Location): [facility]      Consent: ?The patient/guardian has verbally consented to: the potential risks and benefits of telemedicine (video visit) versus in person care; bill my insurance or make self-payment for services provided; and responsibility for payment of non-covered services.       Mode of Communication:??Video Conference via Zoom      As the provider I attest to compliance with applicable laws and regulations related to telemedicine.        Patient attended a video group session on the following days: ?          GROUP NOTE:  DATE OF SERVICE:  July 7, 2021    START TIME:  5:30 PM    END TIME:  7:07 PM    FACILITATOR(S):  VANDANA Lord, [place] [facility] [place]    TOPIC: BEH Group Therapy, Problem Gambling Group - Family    Number of patients attending the group: 7      In-person: 0      Video Conference: 7  *1 group member joined at 6:02  * 1 group member joined at 6:05  * 1 group member joined at 6:18     1 family members were also in attendance via video conference.    Group Length:   97 minutes    Group Therapy Type:  Problem Gambling Family Group    Group Attendance:  Client attended group     Summary of Group / Topics Discussed: Jv-Anon member joined group and shared their story of how gambling has impacted them and their family.  Guest speaker shared her journey and the the benefits of Jv-Anon as well as additional resources such as using [facility] jairo for counseling for families.  Following, group discussed the impact of gambling on families such as financial,  emotional and the isolation and disconnection from families addiction causes.  Group discussed how to rebuild trust and connections with others. (DM 4)     Patient's response to the group topic/interactions: PT's shared how they benefited from hearing the speaker's story and were able to give feedback and ask questions and how they were able to relate with their families.      Client specific details: PT shared in group discussion with her brother joining the group.  PT's brother shared his feelings of helplessness with attempts to help his sister in the past and PT shared how she attempted to keep her gambling behaviors a secret.  Both PT and her brother shared in group discussion following on rebuilding connections.

## 2021-07-13 ENCOUNTER — HOSPITAL ENCOUNTER (OUTPATIENT)
Dept: BEHAVIORAL HEALTH | Facility: CLINIC | Age: 46
End: 2021-07-13
Attending: FAMILY MEDICINE
Payer: COMMERCIAL

## 2021-07-13 PROCEDURE — 90853 GROUP PSYCHOTHERAPY: CPT | Mod: GT | Performed by: COUNSELOR

## 2021-07-14 ENCOUNTER — HOSPITAL ENCOUNTER (OUTPATIENT)
Dept: BEHAVIORAL HEALTH | Facility: CLINIC | Age: 46
End: 2021-07-14
Attending: FAMILY MEDICINE
Payer: COMMERCIAL

## 2021-07-14 PROCEDURE — 90837 PSYTX W PT 60 MINUTES: CPT | Mod: GT | Performed by: COUNSELOR

## 2021-07-14 NOTE — PROGRESS NOTES
Individual counseling session:    Telemedicine Visit: The patient's condition can be safely assessed and treated via synchronous audio and visual telemedicine encounter.      Reason for Telemedicine Visit: Services only offered telehealth    Originating Site (Patient Location): Patient's home    Distant Site (Provider Location): Provider Remote Setting    Consent:  The patient/guardian has verbally consented to: the potential risks and benefits of telemedicine (video visit) versus in person care; bill my insurance or make self-payment for services provided; and responsibility for payment of non-covered services.     Mode of Communication:  Video Conference via [employer]    As the provider I attest to compliance with applicable laws and regulations related to telemedicine.    Counselor verified Patient with 2-point verification: Patient is known to provider.    Video visit start time: 9:59am, patient lost audio, went to a phone call until the end of the visit  Video visit end time: 10:53am    Length of session: 54 mins    D: Patient reported in group last night that her mother and sister had come into town and her mother ended up in the hospital all weekend.  She is concerned about the prognosis, so was offered a 1:1 session in the morning.  Patient met with counselor for a 1:1 session to discuss how she was doing with her mother's illness. She reports being very thankful that her sister, who is a traveling nurse, was also in town to help with her mother. Patient feels guilt for not being able to help her mother due to her past gambling.    I: Counselor facilitated 1:1 session.  A: Patient reported feeling guilt and sadness over her mothers illness, patient reported feeling better being able to talk through her feelings.  P: Patient reached out to her family to talk about her feelings. Patient was encouraged to attend in person group and GA, build her recovery support.  Patient is struggling to find a job and will  be losing her unemployment soon.  Patient will reach out to counselors if she needs an individual session, patient is meeting with her regular weekly individual with the other group counselor on Friday.    Emily Crowley MS, LADC, ICGC-II

## 2021-07-14 NOTE — PROGRESS NOTES
Group Therapy Documentation     Was the patient seen in-person or via Telemedicine (if in-person skip to Group Note): Telemedicine    If Telemedicine: The patient's condition can be safely assessed and treated via synchronous audio and visual telemedicine encounter. ? ?      Reason for Telemedicine Visit:? Due to Covid, only offering telehealth at this time.    Originating Site (Patient Location):  Home    Distant Site (Provider Location): Counselor off site    Consent: ?The patient/guardian has verbally consented to: the potential risks and benefits of telemedicine (video visit) versus in person care; bill my insurance or make self-payment for services provided; and responsibility for payment of non-covered services.       Mode of Communication:??Video Conference via Zoom      As the provider I attest to compliance with applicable laws and regulations related to telemedicine.        Patient attended a video group session on the following days: ?          GROUP NOTE:  DATE OF SERVICE:  July 13, 2021    START TIME:  5:30pm    END TIME:  7:30pm    FACILITATOR(S):    Emily Crowley [facility], [facility], [facility]    TOPIC: BEH Group Therapy, Problem Gambling Group     Number of patients attending the group: 12    Group Length:   120 mins    Group Therapy Type:  Problem Gambling Group    Group Attendance:  Client attended group     Summary of Group / Topics Discussed: GA speaker Bill came to group to share about Gamblers Anonymous, how the 12 steps work, how he benefited from the 12 steps after completing outpatient, and how he continues to benefit now that he is 5+ years clean from gambling.  The rest of the time was spent talking about patient billing and the jairo as some of the patients have been receiving bills.  Also a couple the group members are nearing completion and their outdates were discussed.      Patient's response to the group topic/interactions:  Patient benefited from learning more about ongoing supports in the  community to support ongoing recovery from problem gambling.       Client specific details:   Patient shared that her mom had come into town and ended up in the hospital all weekend.  Patient and counselor set up a time to talk tomorrow morning as patient seemed to need to process what had happened and the news she received about the prognosis.

## 2021-07-16 ENCOUNTER — HOSPITAL ENCOUNTER (OUTPATIENT)
Dept: BEHAVIORAL HEALTH | Facility: CLINIC | Age: 46
End: 2021-07-16
Attending: FAMILY MEDICINE
Payer: COMMERCIAL

## 2021-07-16 PROCEDURE — 90837 PSYTX W PT 60 MINUTES: CPT | Mod: GT

## 2021-07-16 NOTE — PROGRESS NOTES
Individual counseling session:    Telemedicine Visit: The patient's condition can be safely assessed and treated via synchronous audio and visual telemedicine encounter.      Reason for Telemedicine Visit: Services only offered telehealth    Originating Site (Patient Location): Patient's home    Distant Site (Provider Location): Provider Remote Setting    Consent:  The patient/guardian has verbally consented to: the potential risks and benefits of telemedicine (video visit) versus in person care; bill my insurance or make self-payment for services provided; and responsibility for payment of non-covered services.     Mode of Communication:  Video Conference via [employer]    As the provider I attest to compliance with applicable laws and regulations related to telemedicine.    Counselor verified Patient with 2-point verification: Patient is known to provider.    Video visit start time: 10:00 AM  Video visit end time: 11:14 AM    Video session began and continued to have connection disruptions.  At 10:19 AM, counselor called PT on the phone and continued individual counseling session.    Length of session: 74 minutes    D: PT met with counselor for scheduled individual counseling session.  PT shared emotions regarding her mother's medical diagnosis and her ability to seek support from other family members and friends.  PT denied any gambling behaviors, reporting an emotional week.  PT denied experiencing any suicidal and/or self harm ideation at this time.  PT shared continued feelings of guilt and shame regarding her mother and wanting to continue to put effort into her recovery.  PT reported feeling encouraged in her recovery.  PT shared emotions with regards to her brother and events from her mothers hospital stay and has requested the Release of Information on file for her brother, Rodrigo Anne be rescinded and would no longer like him to be involved in family group.  PT reported feeling some relief being able  to share her feelings with counseling staff.  I: Counselor facilitated 1:1 counseling session.  A: PT appears to be working through her emotions with her family and is able to seek support.  P: PT will continue to work towards goals, including journalling her emotions and begin seeking a GA meeting she is able to attend.      Kristi Camarillo, VANDANA, CGC

## 2021-07-21 ENCOUNTER — HOSPITAL ENCOUNTER (OUTPATIENT)
Dept: BEHAVIORAL HEALTH | Facility: CLINIC | Age: 46
End: 2021-07-21
Attending: FAMILY MEDICINE
Payer: COMMERCIAL

## 2021-07-21 PROCEDURE — 90853 GROUP PSYCHOTHERAPY: CPT | Mod: GT

## 2021-07-22 ENCOUNTER — HOSPITAL ENCOUNTER (OUTPATIENT)
Dept: BEHAVIORAL HEALTH | Facility: CLINIC | Age: 46
End: 2021-07-22
Attending: FAMILY MEDICINE
Payer: COMMERCIAL

## 2021-07-22 PROCEDURE — 90853 GROUP PSYCHOTHERAPY: CPT | Mod: GT

## 2021-07-22 NOTE — PROGRESS NOTES
"Group Therapy Documentation     Was the patient seen in-person or via Telemedicine (if in-person skip to Group Note): Telemedicine    If Telemedicine: The patient's condition can be safely assessed and treated via synchronous audio and visual telemedicine encounter. ? ?      Reason for Telemedicine Visit:? Due to Covid, only offering telehealth at this time.    Originating Site (Patient Location):  Home    Distant Site (Provider Location): Counselor off site    Consent: ?The patient/guardian has verbally consented to: the potential risks and benefits of telemedicine (video visit) versus in person care; bill my insurance or make self-payment for services provided; and responsibility for payment of non-covered services.       Mode of Communication:??Video Conference via Zoom      As the provider I attest to compliance with applicable laws and regulations related to telemedicine.        Patient attended a video group session on the following days: ?          GROUP NOTE:  DATE OF SERVICE:  July 21, 2021    START TIME:  5:30pm    END TIME:  7:05 PM    FACILITATOR(S):    VANDANA Lord, [facility] [place] [facility] [place]    TOPIC: BEH Group Therapy, Problem Gambling Group     Number of patients attending the group: 4  *One PT joined group at 5:53   *One PT joined group at 6:16    Group Length:   95 minutes    Group Therapy Type:  Problem Gambling Group    Group Attendance:  Client attended group     Summary of Group / Topics Discussed: PT's checked in with introductions and how they were doing.  Group discussed guilt and shame and the impact on themselves individually and their family members.  Group also discussed various topics such as vulnerability, forgiveness, courage and self compassion and how they applied, including building of trust within each individual and their families and building connections.  Group watched short video \"Listening to Shame\" by Colleen Lee.  Group discussion followed with various topics listed.   "   Patient's response to the group topic/interactions:  PT appeared to gain insight into understanding guilt and shame and the effects of shame and allowing vulnerability and courage into recovery and connection with others.      Client specific details:   PT shared she has begun re implementing journalling and guided meditation.  PT reports feelings of guilt and shame within aspects of her family and gambling behaviors and reports she continues to work through these identified feelings.  PT actively engaged in group discussion.

## 2021-07-22 NOTE — ADDENDUM NOTE
Encounter addended by: Kristi Camarillo LADC on: 7/21/2021 9:28 PM   Actions taken: Clinical Note Signed
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
## 2021-07-23 NOTE — ADDENDUM NOTE
[facility] on: 7/23/2021 1:28 PM   Actions taken: Flowsheet accepted

## 2021-07-23 NOTE — ADDENDUM NOTE
Encounter addended by: Kristi Camarillo LADC on: 7/23/2021 10:55 AM   Actions taken: Clinical Note Signed

## 2021-07-23 NOTE — PROGRESS NOTES
"Group Therapy Documentation     Was the patient seen in-person or via Telemedicine (if in-person skip to Group Note): Telemedicine    If Telemedicine: The patient's condition can be safely assessed and treated via synchronous audio and visual telemedicine encounter. ? ?      Reason for Telemedicine Visit:? Due to Covid, only offering telehealth at this time.    Originating Site (Patient Location):  Home    Distant Site (Provider Location): Counselor off site    Consent: ?The patient/guardian has verbally consented to: the potential risks and benefits of telemedicine (video visit) versus in person care; bill my insurance or make self-payment for services provided; and responsibility for payment of non-covered services.       Mode of Communication:??Video Conference via Zoom      As the provider I attest to compliance with applicable laws and regulations related to telemedicine.        Patient attended a video group session on the following days: ?          GROUP NOTE:  DATE OF SERVICE:  July 22, 2021    START TIME:  5:30pm    END TIME:  7:26 PM    FACILITATOR(S):    VANDANA Lord, [facility]    TOPIC: BEH Group Therapy, Problem Gambling Group     Number of patients attending the group: 9  *One PT joined group at 6:14 PM  *One PT left at 6:30 PM    Group Length:   116 minutes    Group Therapy Type:  Problem Gambling Group    Group Attendance:  Client attended group     Summary of Group / Topics Discussed: PT's checked in with introductions and introduction of new group member.  A Member from Phase III - Peer led group joined group and shared information about the peer led phone group on Monday evenings.  Group discussed various meetings and supports offered and connecting to supports including utilizing peer group list.  One PT shared and processed assignment \"Self Sabotage and our self-defeating Behaviors\" and accepted feedback from both counselor and peers.  Group continued with discussion on identifying " each various behaviors and beliefs supporting self defeating thoughts and behaviors with discussion on minimizing these beliefs and placing priority on needs such as mental health, emotional health, spiritual health and/or physical health in recovery.  Group also discussed the addiction cycle and how these irrational beliefs support addictive behaviors and thoughts.    Patient's response to the group topic/interactions:  PT gained insight into the the addiction cycle and overcoming sabotaging and resistance to seeking supports from others.      Client specific details:  PT checked in with introduction.  PT shared it has been a better week and is able to acknowledge the emotional difficulties from the previous week without gambling.  PT is actively engaged in group processes.

## 2021-07-27 ENCOUNTER — HOSPITAL ENCOUNTER (OUTPATIENT)
Dept: BEHAVIORAL HEALTH | Facility: CLINIC | Age: 46
End: 2021-07-27
Attending: FAMILY MEDICINE
Payer: COMMERCIAL

## 2021-07-27 PROCEDURE — 90837 PSYTX W PT 60 MINUTES: CPT | Mod: GT

## 2021-07-27 NOTE — PROGRESS NOTES
Individual counseling session:    Telemedicine Visit: The patient's condition can be safely assessed and treated via synchronous audio and visual telemedicine encounter.      Reason for Telemedicine Visit: Services only offered telehealth    Originating Site (Patient Location): Patient's home    Distant Site (Provider Location): Provider Remote Setting    Consent:  The patient/guardian has verbally consented to: the potential risks and benefits of telemedicine (video visit) versus in person care; bill my insurance or make self-payment for services provided; and responsibility for payment of non-covered services.     Mode of Communication:  Video Conference via Vivisimo    As the provider I attest to compliance with applicable laws and regulations related to telemedicine.    Counselor verified Patient with 2-point verification: Patient is known to provider.    Video visit start time: 10:00 AM  Video visit end time: 11:01 AM    Length of session: 61 minutes    D: PT met with counselor for scheduled individual counseling session.  PT shared she has begun to put focus on her physical well being, reporting he is not in need of surgery at this time for a Lipoma, reporting she will follow medical recommendations to loose weight with eating nutritious foods and implanting exercise such as walking.  PT reports struggling with Sciatic pain, reporting she will begin physical therapy. PT also reports continued efforts with journalling and positive affirmations and continuing relationship building with her mom and sister. PT has identified a GA meeting she will attend and has identified a goal of attending (either inperson or virtually/phone) a GA meeting in the next 2 weeks to build on social supports.  PT reports continued efforts with building on assertiveness and establishing healthy boundaries.  PT reported she continued to apply for jobs as her unemployment will soon end, reporting she is also working with the housing  manager for additional employment/financial resources and support.   I: Counselor facilitated 1:1 session.  A: PT appears motivated and engaged in her recovery at this time.  P: PT to continue to meet with counselor weekly and attend group Wed & Thursday, in addition to established goals indicated above (GA and employment).

## 2021-07-28 ENCOUNTER — HOSPITAL ENCOUNTER (OUTPATIENT)
Dept: BEHAVIORAL HEALTH | Facility: CLINIC | Age: 46
End: 2021-07-28
Attending: FAMILY MEDICINE
Payer: COMMERCIAL

## 2021-07-28 PROCEDURE — 90853 GROUP PSYCHOTHERAPY: CPT | Mod: GT

## 2021-07-29 ENCOUNTER — HOSPITAL ENCOUNTER (OUTPATIENT)
Dept: BEHAVIORAL HEALTH | Facility: CLINIC | Age: 46
End: 2021-07-29
Attending: FAMILY MEDICINE
Payer: COMMERCIAL

## 2021-07-29 PROCEDURE — 90853 GROUP PSYCHOTHERAPY: CPT | Mod: GT

## 2021-07-29 NOTE — PROGRESS NOTES
Family  Group Documentation     Was the patient seen in-person or via Telemedicine (if in-person skip to Group Note): Telemedicine    If Telemedicine: The patient's condition can be safely assessed and treated via synchronous audio and visual telemedicine encounter. ? ?      Reason for Telemedicine Visit:? Group only offered via telemedicine.    Originating Site (Patient Location):  Home    Distant Site (Provider Location): [facility]      Consent: ?The patient/guardian has verbally consented to: the potential risks and benefits of telemedicine (video visit) versus in person care; bill my insurance or make self-payment for services provided; and responsibility for payment of non-covered services.       Mode of Communication:??Video Conference via Zoom      As the provider I attest to compliance with applicable laws and regulations related to telemedicine.        Patient attended a video group session on the following days: ?          GROUP NOTE:  DATE OF SERVICE:  July 28, 2021    START TIME:  5:30 PM    END TIME:  7:19 PM    FACILITATOR(S):  VANDANA Lord, [facility]    TOPIC: BEH Group Therapy, Problem Gambling Group - Family    Number of patients attending the group: 5      In-person: 0      Video Conference:5  0 family members were also in attendance via video conference.    Group Length:   109 minutes    Group Therapy Type:  Problem Gambling Family Group    Group Attendance:  Client attended group     Summary of Group / Topics Discussed: Group checked in with events from the week.  Group topic included discussion on feelings and defenses and human need for connection.  Included in the discussion on the Limbic System and emotions.  Also discussion was held on how defenses detur from feeling genuine emotions and making connections. PT's encouraged to identify, own and express their emotions.     Patient's response to the group topic/interactions:  PT gained insight into the  importance of connection in recovery and identifying and expressing genuine emotions.      Client specific details:   PT shared her focus on her physical health and how she continues to share her emotions and feels others, such as her family have began to shared their emotions equally.

## 2021-07-29 NOTE — ADDENDUM NOTE
Encounter addended by: Kristi Camarillo LADC on: 7/28/2021 9:01 PM   Actions taken: Clinical Note Signed

## 2021-07-30 NOTE — PROGRESS NOTES
Patient:  Malaika Anne                         Date of Assessment: 6/3/2021            Problem Gambling Progress Note and Treatment Plan Review     Attendance  Group/Individual:Phase I.  PT attends group via video conference.    Groups Attended: 7/28/2021 & 7/29/2021.  PT also attended individual counseling session on 7/27/2021.  *No group on 7/27/2021 due to programming staffing.    Support group attended this week: no    Reporting sobriety:  yes    Client Treatment Goals:   1. Have the ability to identify and control triggers and urges to padilla.  2. Develop copings skills  3. Rebuild relationships with family and friends.      Treatment Plan     Treatment Plan Review competed on: July 30, 2021    Staff Members contributing:  Kristi Camarillo, VANDANA, [facility] & Emily Crowley MA, VANDANA, [facility]                          Received Supervision:  yes    Client: contributed to goals and plan.  Client received copy of plan/revised plan: Yes  Client agrees with plan/revised plan: Yes    Changes to Treatment Plan: No  New Goals added since last review:  No additional goals added at this time.    Goals worked on since last review:  Dimension 1 PT denied any gambling behaviors.  Dimension 2 PT reports she will begin physical therapy to address sciatic pain.  PT reports her ability to navigate the health care system independently.  Dimension 3 PT reports feeling her mental health is stable at this time, reporting she continues to journal her emotions.  PT attended group identifying feelings and used defenses in addition to applying authenticity into recovery and self esteem.  Dimension 4 PT attends all programming and is an active participant in group and treatment processes.    Dimension 5 PT continues to gain insight into relapse factors, including understanding the impact of her dysregulated emotions.  PT attended group identifying daily inventory.  Dimension 6 PT reports continued efforts to gain employment at this  time.  PT reports she continues to reconnect and build relationships with her family, reporting their support of her recovery. PT continues to be encouraged to attend support meeting in the community to build on her support network.    Strategies effective: yes    Strategies need these changes: Continue to seek employment opportunities, build on daily structure, attend support meetings to increase supports in the commuity.        Depression and Anxiety at Intake:     Patient reported she is experiencing signs of depression and will talk to a counselor once she has insurance.    Patient's PHQ-9 score was 23  out of 27, indicating severe depression on 05/25/21 and had dropped to 16/27, no longer reporting suicidal ideation at today's eval.  Patient's ADIEL-7 score was 15, indicating severe anxiety on 05/25/21 and dropped to an 8 and showing mild anxiety during today's eval..  Patient denied suicide attempts in the past.     Intake Dimension Ratings:    Dimension 1  0 .    Dimension 2  0 .    Dimension 3  2 .    Dimension 4  1 .    Dimension 5  2 .    Dimension 6  3 .      Guide to Risk Ratings for Suicidality:   IDEATION: Active thoughts of suicide? INTENT: Intent to follow on suicide? PLAN: Plan to follow through on suicide? Level of Risk:   IF Yes Yes Yes Patient = High Emergent   IF Yes Yes No Patient = High Urgent/Non-Emergent   IF Yes No No Patient = Moderate Non-Urgent   IF No No   No Patient = Low Risk   The patient's ADDITIONAL RISK FACTORS and lack of PROTECTIVE FACTORS may increase their overall suicide risk ratings.     Patient's/client's current risk rating:  Low Risk  PT denied any gambling behaviors.  Safety Plan on File  No risk identified.    Family Involvement:   INESSA signed    Data:   offered feedback good insight client did actively participate      Intervention:   Cognitive Behavioral Therapy  Counselor feedback  Education  Relapse prevention      Assessment:   Stages of Change  Model  Action    Appears/Sounds:  Cooperative  Motivated  Engaged      Plan:  Focus on recovery environment  Monitor emotional/physical health  Continue to work towards treatment plan goals.    VANDANA Lord

## 2021-07-30 NOTE — ADDENDUM NOTE
Encounter addended by: Kristi Camarillo [facility] on: 7/30/2021 10:50 AM   Actions taken: Flowsheet accepted, Clinical Note Signed

## 2021-07-30 NOTE — PROGRESS NOTES
"Group Therapy Documentation     Was the patient seen in-person or via Telemedicine (if in-person skip to Group Note): Telemedicine    If Telemedicine: The patient's condition can be safely assessed and treated via synchronous audio and visual telemedicine encounter. ? ?      Reason for Telemedicine Visit:? Due to Covid, only offering telehealth at this time.    Originating Site (Patient Location):  Home    Distant Site (Provider Location): Counselor off site    Consent: ?The patient/guardian has verbally consented to: the potential risks and benefits of telemedicine (video visit) versus in person care; bill my insurance or make self-payment for services provided; and responsibility for payment of non-covered services.       Mode of Communication:??Video Conference via Zoom      As the provider I attest to compliance with applicable laws and regulations related to telemedicine.        Patient attended a video group session on the following days: ?          GROUP NOTE:  DATE OF SERVICE:  July 29, 2021    START TIME:  5:30pm    END TIME:  7:28 PM    FACILITATOR(S):    VANDANA Lord, [facility]    TOPIC: BEH Group Therapy, Problem Gambling Group     Number of patients attending the group: 9  *One person left @ 6:30 PM  * One person arrived at 6:05 PM  * One person arrived at 6:32 PM    Group Length:   118 minutes    Group Therapy Type:  Problem Gambling Group    Group Attendance:  Client attended group     Summary of Group / Topics Discussed: Group checked in sharing events from the week due to no group on Tuesday.  Group topics included how to spend time on recovery well-being due to cancelled groups on Tuesdays due to program staffing.  Group reviewed assignments \"Taking Daily Inventory\" in addition to discussing various aspects of authenticity.  Group closed with a short video \"How to age gracefully\".     Patient's response to the group topic/interactions:  PT was attentive in group and shared personal " experience with discussed topics.      Client specific details:  PT shared her week and recovery is going well and put focus on her physical well-being.  PT actively shared and particiapated in group discussion.

## 2021-08-04 ENCOUNTER — HOSPITAL ENCOUNTER (OUTPATIENT)
Dept: BEHAVIORAL HEALTH | Facility: CLINIC | Age: 46
End: 2021-08-04
Attending: FAMILY MEDICINE
Payer: COMMERCIAL

## 2021-08-04 PROCEDURE — 90853 GROUP PSYCHOTHERAPY: CPT | Mod: 95

## 2021-08-05 ENCOUNTER — HOSPITAL ENCOUNTER (OUTPATIENT)
Dept: BEHAVIORAL HEALTH | Facility: CLINIC | Age: 46
End: 2021-08-05
Attending: FAMILY MEDICINE
Payer: COMMERCIAL

## 2021-08-05 PROCEDURE — 90853 GROUP PSYCHOTHERAPY: CPT | Mod: GT

## 2021-08-05 NOTE — PROGRESS NOTES
Family  Group Documentation     Was the patient seen in-person or via Telemedicine (if in-person skip to Group Note): Telemedicine    If Telemedicine: The patient's condition can be safely assessed and treated via synchronous audio and visual telemedicine encounter. ? ?      Reason for Telemedicine Visit:? Group only offered via telemedicine.    Originating Site (Patient Location):  Home    Distant Site (Provider Location): [facility]      Consent: ?The patient/guardian has verbally consented to: the potential risks and benefits of telemedicine (video visit) versus in person care; bill my insurance or make self-payment for services provided; and responsibility for payment of non-covered services.       Mode of Communication:??Video Conference via Zoom      As the provider I attest to compliance with applicable laws and regulations related to telemedicine.        Patient attended a video group session on the following days: ?          GROUP NOTE:  DATE OF SERVICE:  August 4, 2021    START TIME:  5:30 PM    END TIME:  7:23 PM    FACILITATOR(S):  VANDANA Lord, [facility]    TOPIC: BEH Group Therapy, Problem Gambling Group - Family    Number of patients attending the group: 5  Family Members attending group: 0    Group Length:   113 minutes    Group Therapy Type:  Problem Gambling Family Group    Group Attendance:  Client attended group     Group checked in with this week s events as no family members attended group.   Group topic discussed various life stressors including stress on families due to addiction.  Various discussions on sources of stress, effects of stress on emotional health, physical health, and stress on thinking patterns, in addition to stress and the brain.  Group discussed the difference between anxiety and stress and discussed various stress reducing strategies such as mindfulness, grounding exercises, eating nutritious meals, exercise, adequate sleep, seeking  help.  in addition to avoiding alcohol, caffeine, sugar, and tobacco.       Patient's response to the group topic/interactions:  PT appeared to gain insight into the effects of stress and insight into additional stress reducing strategies.      Client specific details:   PT shared she has begun to work out and has been offered employment pending a background check. PT also reported having an appointment with a sleep specialist next week.  PT actively participated in group discussion.

## 2021-08-06 ENCOUNTER — HOSPITAL ENCOUNTER (OUTPATIENT)
Dept: BEHAVIORAL HEALTH | Facility: CLINIC | Age: 46
End: 2021-08-06
Attending: FAMILY MEDICINE
Payer: COMMERCIAL

## 2021-08-06 PROCEDURE — 90837 PSYTX W PT 60 MINUTES: CPT | Mod: 95

## 2021-08-06 NOTE — PROGRESS NOTES
"Group Therapy Documentation     Was the patient seen in-person or via Telemedicine (if in-person skip to Group Note): Telemedicine    If Telemedicine: The patient's condition can be safely assessed and treated via synchronous audio and visual telemedicine encounter. ? ?      Reason for Telemedicine Visit:? Due to Covid, only offering telehealth at this time.    Originating Site (Patient Location):  Home    Distant Site (Provider Location): Counselor off site    Consent: ?The patient/guardian has verbally consented to: the potential risks and benefits of telemedicine (video visit) versus in person care; bill my insurance or make self-payment for services provided; and responsibility for payment of non-covered services.       Mode of Communication:??Video Conference via Zoom      As the provider I attest to compliance with applicable laws and regulations related to telemedicine.        Patient attended a video group session on the following days: ?          GROUP NOTE:  DATE OF SERVICE:  August 5, 2021    START TIME:  5:30pm    END TIME:  7:18 PM    FACILITATOR(S):    VANDANA Lord, [facility]    TOPIC: BEH Group Therapy, Problem Gambling Group     Number of patients attending the group: 8    Group Length:   108 minutes    Group Therapy Type:  Problem Gambling Group    Group Attendance:  Client attended group     Summary of Group / Topics Discussed: Group checked in from events from the week.  Check in discussed ways to navigate stressors, including meditation and connection.  One PT presented assignment \"Trigger Inventory\" and group carried with discussion identifying ongoing triggers and urges, and coping strategies, including ways to navigate emotions such as sharing them openly and journaling.      Patient's response to the group topic/interactions:  PT appeared to gain insight into their own personal triggers in addition to shared coping strategies and emotional regulation skills.      Client specific " details:  PT shared she did not get the job she interviewed for due to her background.  PT reported continued gratitude and thankful, reporting she continues to be positive.  PT denied experiencing any urges.

## 2021-08-06 NOTE — PROGRESS NOTES
"Individual counseling session:    Telemedicine Visit: The patient's condition can be safely assessed and treated via synchronous audio and visual telemedicine encounter.      Reason for Telemedicine Visit: Services only offered telehealth    Originating Site (Patient Location): Patient's home    Distant Site (Provider Location): Provider Remote Setting    Consent:  The patient/guardian has verbally consented to: the potential risks and benefits of telemedicine (video visit) versus in person care; bill my insurance or make self-payment for services provided; and responsibility for payment of non-covered services.     Mode of Communication:  Video Conference via [employer]    As the provider I attest to compliance with applicable laws and regulations related to telemedicine.    Counselor verified Patient with 2-point verification: Patient is known to provider.    Video visit start time: 11:00 AM  Video visit end time: 11:58  AM    Length of session: 58 minutes    D: PT met with writer for scheduled individual counseling session via video conference.  PT processed feelings about net getting the job and working to redefine a sense of self.  PT reports reconnecting to her chavez and spending time with her sister, in addition, PT is building on self-care strategies when needed and able to recognize personal time vs. Isolation.  PT reports feeling stable and will continue to seek employment.  Writer will send PT list of felony friendly employers as a resource.  PT identified weekly goals as continued efforts to seek employment and identify GA meeting to attend.  PT reports her willingness to attend in-person group in this program on 8/19/2021.  PT reports continued journalling and practicing gratefulness.  PT reports she also continues to build on healthy assertive communication skills.  PT identified feeling \"proud\".  I: Counselor facilitated 1:1 session.  A: PT appears engaged and motivated towards recovery efforts.  P: " PT to continue to work towards treatment plan goals, continued efforts to seek employment and a GA meeting.  PT is scheduled for additional counseling session with writer next week.

## 2021-08-09 NOTE — ADDENDUM NOTE
Encounter addended by: Kristi Camarillo [facility] on: 8/9/2021 3:17 PM   Actions taken: Flowsheet accepted, Clinical Note Signed

## 2021-08-17 ENCOUNTER — HOSPITAL ENCOUNTER (OUTPATIENT)
Dept: BEHAVIORAL HEALTH | Facility: CLINIC | Age: 46
End: 2021-08-17
Attending: FAMILY MEDICINE
Payer: COMMERCIAL

## 2021-08-17 PROCEDURE — 90837 PSYTX W PT 60 MINUTES: CPT | Mod: 95

## 2021-08-17 NOTE — PROGRESS NOTES
Individual counseling session:    Telemedicine Visit: The patient's condition can be safely assessed and treated via synchronous audio and visual telemedicine encounter.      Reason for Telemedicine Visit: Services only offered telehealth    Originating Site (Patient Location): Patient's home    Distant Site (Provider Location): Provider Remote Setting    Consent:  The patient/guardian has verbally consented to: the potential risks and benefits of telemedicine (video visit) versus in person care; bill my insurance or make self-payment for services provided; and responsibility for payment of non-covered services.     Mode of Communication:  Video Conference via [employer]    As the provider I attest to compliance with applicable laws and regulations related to telemedicine.    Counselor verified Patient with 2-point verification: Patient is known to provider.    Video visit start time: 10:00 AM  Video visit end time: 11:03 AM    Length of session: 63 minutes    D: PT met with writer for scheduled individual counseling session  PT reports recovering from illness last week and missed the week of programming.  PT reported following up with her primary care provider, reporting she is feeling better this week.  PT reports she continues to strengthen her assertive communication skills with others, including family and continued efforts in re engaging with family, including her grandmother.  PT continues to gain insight into problematic behaviors in the past and increase healthy behaviors and relationships in recovery.  PT reports she continues to gain forgiveness and process events from the past.  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  PT reports continued efforts to increase self esteem and self worth with a healthy lifestyle of eating nutritious meals and exercise.  PT reports she will be attending in-person group on 8/19/2021.  I: Counselor facilitated 1:1 session.  A: PT  appears motivated and continues to be engaged in her recovery as evidenced by her continued recovery efforts.  P: PT to continue to work towards treatment plan goals, review assignments and attend group.  PT scheduled to meet with counselor for 1:1 counseling session on 8/31/2021 @ 10:00 AM.

## 2021-08-18 ENCOUNTER — HOSPITAL ENCOUNTER (OUTPATIENT)
Dept: BEHAVIORAL HEALTH | Facility: CLINIC | Age: 46
End: 2021-08-18
Attending: FAMILY MEDICINE
Payer: COMMERCIAL

## 2021-08-18 PROCEDURE — 90853 GROUP PSYCHOTHERAPY: CPT | Mod: GT

## 2021-08-19 ENCOUNTER — HOSPITAL ENCOUNTER (OUTPATIENT)
Dept: BEHAVIORAL HEALTH | Facility: CLINIC | Age: 46
End: 2021-08-19
Attending: FAMILY MEDICINE
Payer: COMMERCIAL

## 2021-08-19 PROCEDURE — 90853 GROUP PSYCHOTHERAPY: CPT | Mod: 95 | Performed by: COUNSELOR

## 2021-08-19 NOTE — PROGRESS NOTES
Family  Group Documentation     Was the patient seen in-person or via Telemedicine (if in-person skip to Group Note): Telemedicine    If Telemedicine: The patient's condition can be safely assessed and treated via synchronous audio and visual telemedicine encounter. ? ?      Reason for Telemedicine Visit:? Group only offered via telemedicine.    Originating Site (Patient Location):  Home    Distant Site (Provider Location): [facility]      Consent: ?The patient/guardian has verbally consented to: the potential risks and benefits of telemedicine (video visit) versus in person care; bill my insurance or make self-payment for services provided; and responsibility for payment of non-covered services.       Mode of Communication:??Video Conference via Zoom      As the provider I attest to compliance with applicable laws and regulations related to telemedicine.        Patient attended a video group session on the following days: ?          GROUP NOTE:  DATE OF SERVICE:  August 18, 2021    START TIME:  5:30 PM    END TIME:  7:09 PM    FACILITATOR(S):  VANDANA Lord, [facility]    TOPIC: BEH Group Therapy, Problem Gambling Group - Family    Number of patients attending the group: 5      In-person: 0      Video Conference: 5  0 family members were also in attendance via video conference.    Group Length:   99 minutes    Group Therapy Type:  Problem Gambling Family Group    Group Attendance:  Client attended group     Summary of Group / Topics Discussed: PT's checked in with events from the week as each group member discussed various stressors and/or recent events or changes.  No family members were in attendance.  Group discussion was held on connection and how addiction often inhibits connections through isolation.  Group discussed defenses used to hinder connection.  Group members were encouraged to identify their defenses hindering deeper connections, in addition, encouraged to connect to  "others, such as family, nature, spirituality, pets, ideas, community and themselves to name a few.  Group watched short LILIANA video / Dariel Gonzalez \"Everything you know about addiction is wrong\" discussion followed.  Group also discussed levels of relationships, identifying superficial, companionship, friendship and important.     Patient's response to the group topic/interactions:  PT appeared to gain insight into the importance of connection in recovery and building relationships.      Client specific details:   PT shared she is considering taking some classes and will decide by the end of the week and how that will impact potential employment.  PT shared her lack of connections in active addiction while struggling with various events.  PT is an active participant in group discussion.  "

## 2021-08-20 NOTE — PROGRESS NOTES
"Group Therapy Documentation     Was the patient seen in-person or via Telemedicine (if in-person skip to Group Note): Telemedicine    If Telemedicine: The patient's condition can be safely assessed and treated via synchronous audio and visual telemedicine encounter. ? ?      Reason for Telemedicine Visit:? Patient chose telemedicine for group tonight.    Originating Site (Patient Location):  Home    Distant Site (Provider Location): [facility], offering Telehealth and in person group.    Consent: ?The patient/guardian has verbally consented to: the potential risks and benefits of telemedicine (video visit) versus in person care; bill my insurance or make self-payment for services provided; and responsibility for payment of non-covered services.       Mode of Communication:??Video Conference via Zoom      As the provider I attest to compliance with applicable laws and regulations related to telemedicine.        Patient attended a video group session on the following days: ?          GROUP NOTE:  DATE OF SERVICE:  August 19, 2021    START TIME:  5:30pm    END TIME:  7:30pm    FACILITATOR(S):    Emily Crowley MS, VANDANA, ICGC-II    TOPIC: BEH Group Therapy, Problem Gambling Group     Number of patients attending the group: 3 in person, 5 via video    Group Length:   120 mins    Group Therapy Type:  Problem Gambling Group    Group Attendance:  Client attended group     Summary of Group / Topics Discussed: Patient's checked in and shared how they were doing in recovery over the past week.  Patient's went over the assignment \"Is Anger due to unmet expectations\", completed first half in group.  Coined out two patient's who completed the program, sharing what they had brought to the program and they shared what they had received from the program and where they feel they had grown.     Patient's response to the group topic/interactions:  Patient listened in group during the topic on the assignment, gave feedback to the " patient coining out.      Client specific details:   Patient shared some medical concerns she is having, but felt like life is going in the right direction.  Patient still needs to complete the first few assignments.    Emily Crowley MS, LADC, ICGC-II

## 2021-08-24 ENCOUNTER — HOSPITAL ENCOUNTER (OUTPATIENT)
Dept: BEHAVIORAL HEALTH | Facility: CLINIC | Age: 46
End: 2021-08-24
Attending: FAMILY MEDICINE
Payer: COMMERCIAL

## 2021-08-24 PROCEDURE — 90853 GROUP PSYCHOTHERAPY: CPT | Mod: 95 | Performed by: COUNSELOR

## 2021-08-25 NOTE — ADDENDUM NOTE
Encounter addended by: Emily Crowley LADC on: 8/25/2021 11:44 AM   Actions taken: Clinical Note Signed

## 2021-08-25 NOTE — PROGRESS NOTES
"Group Therapy Documentation     Was the patient seen in-person or via Telemedicine (if in-person skip to Group Note): Telemedicine    If Telemedicine: The patient's condition can be safely assessed and treated via synchronous audio and visual telemedicine encounter. ? ?      Reason for Telemedicine Visit:? Due to Covid, only offering telehealth at this time.    Originating Site (Patient Location):  Home    Distant Site (Provider Location): Counselor off site    Consent: ?The patient/guardian has verbally consented to: the potential risks and benefits of telemedicine (video visit) versus in person care; bill my insurance or make self-payment for services provided; and responsibility for payment of non-covered services.       Mode of Communication:??Video Conference via Zoom      As the provider I attest to compliance with applicable laws and regulations related to telemedicine.        Patient attended a video group session on the following days: ?          GROUP NOTE:  DATE OF SERVICE:  August 24, 2021    START TIME:  5:30pm    END TIME:  7:10pm     FACILITATOR(S):    Emily Crowley MS, [facility], ICGC-II    TOPIC: BEH Group Therapy, Problem Gambling Group     Number of patients attending the group: 12    Group Length:   100 Minutes    Group Therapy Type:  Problem Gambling Group    Group Attendance:  Client attended group     Summary of Group / Topics Discussed: Counselor discussed \"Flow\" and the need to calm the \"monkey mind\" of problem gamblers.  Discussed many ways to calm the brain, meditation, mindfulness, yoga, coloring, sports, knitting, journaling, and gave handouts on the benefits of these and gratitude.  Patients gave input on what they do to get into flow and to calm their monkey minds.       Patient's response to the group topic/interactions:  Patients benefit from learning coping mechanisms to help with racing thoughts associated with problem gambling addiction.      Client specific details:   Patient checked " in, sharing with the new members what brought them to treatment, and then was an active listener to the discussion.     Emily Crowley MS, LADC, ICGC-II

## 2021-08-25 NOTE — PROGRESS NOTES
Patient:  Malaika Anne                       Date of Assessment: 6/3/2021             Problem Gambling Progress Note and Treatment Plan Review      Attendance  Group/Individual: Phase I.  PT attends group and individual session virtually.      Groups Attended: 8/18/2021, 8/19/2021 and individual counseling session on 8/17/2021.  *No group on 8/17/2021 due to program staffing.     Support group attended this week: no     Reporting sobriety:  yes     Client Treatment Goals:   1. Have the ability to identify and control triggers and urges to padilla.  2. Develop copings skills  3. Rebuild relationships with family and friends.        Treatment Plan      Treatment Plan Review competed on: 08/20/21     Staff Members contributing:    Kristi Camarillo, LADC, [facility] & Emily Crowley, MS, LADC, ICGC-II                         Received Supervision:  yes     Client: contributed to goals and plan.  Client received copy of plan/revised plan: Yes  Client agrees with plan/revised plan: Yes     Changes to Treatment Plan: No  New Goals added since last review:  No additional goals added at this time.     Goals worked on since last review:  DM1: Patient reporting sobriety from gambling at this time.  DM2: Patient reports her ability to navigate the health care system independently.    DM3:  Patient gained understanding of how anger impacts addiction.  DM4:  Patient learned about addiction and connection, and the need for connection to community for long term sobriety.   DM5:   Patient continues to gain insight into problematic behaviors in the past and increase healthy behaviors and relationships in recovery.  DM6:  Patient considering additional training for greater job prospects in the future.      Strategies effective: yes     Strategies need these changes: Build on daily structure and balance, in addition to building on supports in the community.     Depression and Anxiety at Intake:    Patient reported experiencing signs of  depression and will talk to a counselor once she has insurance.    Patient's PHQ-9 score was 23 out of 27, indicating severe depression on 05/25/21 and had dropped to 16/27, no longer reporting suicidal ideation at today's eval.  Patient's ADIEL-7 score was 15, indicating severe anxiety on 05/25/21 and dropped to an 8 and showing mild anxiety during today's eval.  Patient denied suicide attempts in the past.      Intake Dimension Ratings:    Dimension 1: 0     Dimension 2: 0     Dimension 3: 2     Dimension 4: 1     Dimension 5: 2     Dimension 6: 3      Patient's/client's current risk rating:  Low Risk  PT denied any gambling.  Safety Plan on File  No risk identified.     Family Involvement:   INESSA signed     Data:   offered feedback client did actively participate     Intervention:   Counselor feedback  Emotional management  Relapse prevention     Assessment:   Stages of Change Model:  Maintenance     Appears/Sounds:  Cooperative  Motivated  Engaged     Plan:  Focus on recovery environment  Monitor emotional/physical health  Continue working towards treatment plan goals.     Emily Crowley MS, LADC, ICGC-II

## 2021-08-26 ENCOUNTER — TELEPHONE (OUTPATIENT)
Dept: BEHAVIORAL HEALTH | Facility: CLINIC | Age: 46
End: 2021-08-26

## 2021-08-27 NOTE — TELEPHONE ENCOUNTER
[facility] emailed to [facility] to having tests for school.     Emiyl Crowley MS, [facility], ICGC-ii

## 2021-08-31 ENCOUNTER — HOSPITAL ENCOUNTER (OUTPATIENT)
Dept: BEHAVIORAL HEALTH | Facility: CLINIC | Age: 46
End: 2021-08-31
Attending: FAMILY MEDICINE
Payer: COMMERCIAL

## 2021-08-31 PROCEDURE — 90853 GROUP PSYCHOTHERAPY: CPT | Mod: 95 | Performed by: COUNSELOR

## 2021-08-31 NOTE — ADDENDUM NOTE
Encounter addended by: Emily Crowley LADC on: 8/31/2021 3:40 PM   Actions taken: Clinical Note Signed

## 2021-08-31 NOTE — PROGRESS NOTES
Patient:  Malaika Anne                       Date of Assessment: 6/3/2021             Problem Gambling Progress Note and Treatment Plan Review      Attendance  Group/Individual: Phase I.  PT attends group and individual session virtually.      Groups Attended: 8/24/21     Support group attended this week: no     Reporting sobriety:  yes     Client Treatment Goals:   1. Have the ability to identify and control triggers and urges to padilla.  2. Develop copings skills  3. Rebuild relationships with family and friends.        Treatment Plan      Treatment Plan Review competed on: 08/27/21     Staff Members contributing:    Kristi Camarillo, LADC, [facility] & Emily Crowley, MS, LADC, ICGC-II                         Received Supervision:  yes     Client: contributed to goals and plan.  Client received copy of plan/revised plan: Yes  Client agrees with plan/revised plan: Yes     Changes to Treatment Plan: No  New Goals added since last review:  No additional goals added at this time.     Goals worked on since last review:  DM1: Patient reporting sobriety from gambling at this time.  DM2: Patient reports her ability to navigate the health care system independently.    DM3:  Patient gained understanding of how anger impacts addiction.  DM4:  Patient learned about addiction and connection, and the need for connection to community for long term sobriety.   DM5:   Patient was introduced to  flow  and the way to calm racing thoughts through meditation, yoga, coloring, mindfulness, and other ways to let the brain stop and calm itself down.   DM6:  Patient considering additional training for greater job prospects in the future.      Strategies effective: yes     Strategies need these changes: Build on daily structure and balance, in addition to building on supports in the community.     Depression and Anxiety at Intake:    Patient reported experiencing signs of depression and will talk to a counselor once she has insurance.     Patient's PHQ-9 score was 23 out of 27, indicating severe depression on 05/25/21 and had dropped to 16/27, no longer reporting suicidal ideation at today's eval.  Patient's ADIEL-7 score was 15, indicating severe anxiety on 05/25/21 and dropped to an 8 and showing mild anxiety during today's eval.  Patient denied suicide attempts in the past.      Intake Dimension Ratings:    Dimension 1: 0     Dimension 2: 0     Dimension 3: 2     Dimension 4: 1     Dimension 5: 2     Dimension 6: 3      Patient's/client's current risk rating:  Low Risk  PT denied any gambling.  Safety Plan on File  No risk identified.     Family Involvement:   INESSA signed     Data:   offered feedback client did actively participate     Intervention:   Counselor feedback  Emotional management  Relapse prevention     Assessment:   Stages of Change Model:  Maintenance     Appears/Sounds:  Cooperative  Motivated  Engaged     Plan:  Focus on recovery environment  Monitor emotional/physical health  Continue working towards treatment plan goals.     Emily Crowley MS, LADC, ICGC-II

## 2021-09-01 NOTE — PROGRESS NOTES
"Group Therapy Documentation     Was the patient seen in-person or via Telemedicine (if in-person skip to Group Note): Telemedicine    If Telemedicine: The patient's condition can be safely assessed and treated via synchronous audio and visual telemedicine encounter. ? ?      Reason for Telemedicine Visit:? Due to Covid, only offering telehealth at this time.    Originating Site (Patient Location):  Home    Distant Site (Provider Location): Counselor off site    Consent: ?The patient/guardian has verbally consented to: the potential risks and benefits of telemedicine (video visit) versus in person care; bill my insurance or make self-payment for services provided; and responsibility for payment of non-covered services.       Mode of Communication:??Video Conference via Zoom      As the provider I attest to compliance with applicable laws and regulations related to telemedicine.        Patient attended a video group session on the following days: ?          GROUP NOTE:  DATE OF SERVICE:  August 31, 2021    START TIME:  5:30pm    END TIME:  7:16pm    FACILITATOR(S):    Emily Crowley MS, [facility], ICGC-II    TOPIC: BEH Group Therapy, Problem Gambling Group     Number of patients attending the group: 9    Group Length:   1:46    Group Therapy Type:  Problem Gambling Group    Group Attendance:  Client attended group     Summary of Group / Topics Discussed: Check in, followed by a patient presenting her \"Thinking Errors Assignment\" then the group discussed CBT as discussed last week and explained the assignments that had been emailed last Thursday.  The group then went over the \"Leisure, Exercise, Balance Assignment\".     Patient's response to the group topic/interactions:  Patient gained understanding of the importance of Leisure and balance in recovery.      Client specific details:   Patient shared that her brother showed up in town unexpected, but she did not see him.  "

## 2021-09-02 ENCOUNTER — HOSPITAL ENCOUNTER (OUTPATIENT)
Dept: BEHAVIORAL HEALTH | Facility: CLINIC | Age: 46
End: 2021-09-02
Attending: FAMILY MEDICINE
Payer: COMMERCIAL

## 2021-09-02 PROCEDURE — 90853 GROUP PSYCHOTHERAPY: CPT | Mod: GT

## 2021-09-02 PROCEDURE — 90837 PSYTX W PT 60 MINUTES: CPT | Mod: 95,59

## 2021-09-02 NOTE — PROGRESS NOTES
Individual counseling session:    Telemedicine Visit: The patient's condition can be safely assessed and treated via synchronous audio and visual telemedicine encounter.      Reason for Telemedicine Visit: Services only offered telehealth    Originating Site (Patient Location): Patient's home    Distant Site (Provider Location): Provider Remote Setting    Consent:  The patient/guardian has verbally consented to: the potential risks and benefits of telemedicine (video visit) versus in person care; bill my insurance or make self-payment for services provided; and responsibility for payment of non-covered services.     Mode of Communication:  Video Conference via [employer]    As the provider I attest to compliance with applicable laws and regulations related to telemedicine.    Counselor verified Patient with 2-point verification: Patient is known to provider.    Video visit start time: 1:00 PM  Video visit end time: 2:05 PM    Video visit was disconnected and was unable to reconnect.  Writer contacted PT by phone at 1:23 and continued individual counseling session.,    Length of session: 65 minutes    D: Writer met with PT for scheduled individual counseling session via video conference.  PT shared how she is doing in her recovery and able to acknowledge her growth.  PT continues to identify and build on emotional regulation as PT reports experiences various emotions with regards to family members.  PT reports she was not able to to enroll in educational program due to legal involvement and has contacted a temp agency for employent.  PT reported receiving felony friendly information from other group counselor for review and this writer sent PT link for [place] [employer] employer resources.  PT reported her willingness to gain a mental health therapist and will meet with counseling staff as needed.  Writer sent PT link to [facility] problem Gambling treatment providers.  PT also reports she continues to build  and mend relationships with family.  I: Counselor facilitated 1:1 session.  A: PT appears engaged and motivated in her recovery.  P: PT to continue to attend group, gain a mental health therapist, complete homework assignments and meet with counseling staff as needed.

## 2021-09-03 NOTE — ADDENDUM NOTE
Encounter addended by: Kristi Camarillo [facility] on: 9/3/2021 4:46 PM   Actions taken: Flowsheet accepted, Clinical Note Signed

## 2021-09-03 NOTE — PROGRESS NOTES
Patient:  Malaika Anne                         Date of Assessment: 6/3/2021            Problem Gambling Progress Note and Treatment Plan Review     Attendance  Group/Individual: Phase II.  PT attends group and individual sessions via video conference.    Groups Attended: 8/31/2021 & 9/2/2021.  PT also attended individual counseling session on 9/2/2021.    Support group attended this week: no    Reporting sobriety:  yes    Client Treatment Goals:   1. Have the ability to identify and control triggers and urges to padilla.  2. Develop copings skills  3. Rebuild relationships with family and friends.      Treatment Plan     Treatment Plan Review competed on: September 3, 2021    Staff Members contributing:  Kristi Camarillo, CANDIDAC, [facility] & Emily Crowley MA, VANDANA, [facility]                          Received Supervision:  yes    Client: contributed to goals and plan.  Client received copy of plan/revised plan: Yes  Client agrees with plan/revised plan: Yes    Changes to Treatment Plan: No  New Goals added since last review:  No additional goals added at this time.    Goals worked on since last review:  Dimension 1 PT denies any gambling behaviors at this time.  Dimension 2 PT reports working with medical clinicians at this time, reporting her ability to navigate the health care system independently.  Dimension 3 PT denies any changes in her mental health at this time and reports she continues to journal.  PT attended group discussion on CBT skills and identifying thinking errors.  PT verbalizes her willingness to explore gaining a mental health therapist at this time.  Dimension 4 PT attends group and individual counseling sessions and is an active participant in her recovery.    Dimension 5 PT reports continued efforts in strengthening her recovery at this time, including increasing assertive communication skills at this time as well as building emotional regulation strategies.  Dimension 6 PT reports she continues  to seek employment at this time.  PT denied attending support meeting in the community.  PT reports continued efforts to mend relationships with family at this time.  PT attended group discussion on identifying leisure, exercise and balance into recovery.    Strategies effective: yes    Strategies need these changes: Continue to seek employment and identify healthy leisure activies and build on support network in the community.    Depression and Anxiety at Intake:    Patient reported experiencing signs of depression and will talk to a counselor once she has insurance.    Patient's PHQ-9 score was 23 out of 27, indicating severe depression on 05/25/21 and had dropped to 16/27, no longer reporting suicidal ideation at today's eval.  Patient's ADIEL-7 score was 15, indicating severe anxiety on 05/25/21 and dropped to an 8 and showing mild anxiety during today's eval.  Patient denied suicide attempts in the past.      Intake Dimension Ratings:    Dimension 1: 0     Dimension 2: 0     Dimension 3: 2     Dimension 4: 1     Dimension 5: 2     Dimension 6: 3       Guide to Risk Ratings for Suicidality:   IDEATION: Active thoughts of suicide? INTENT: Intent to follow on suicide? PLAN: Plan to follow through on suicide? Level of Risk:   IF Yes Yes Yes Patient = High Emergent   IF Yes Yes No Patient = High Urgent/Non-Emergent   IF Yes No No Patient = Moderate Non-Urgent   IF No No   No Patient = Low Risk   The patient's ADDITIONAL RISK FACTORS and lack of PROTECTIVE FACTORS may increase their overall suicide risk ratings.     Patient's/client's current risk rating:  Low Risk  PT denied any gambling behaviors.  Safety Plan on File  No risk identified.    Family Involvement:   Phase II    Data:   offered feedback good insight client did actively participate      Intervention:   Cognitive Behavioral Therapy  Counselor feedback  Emotional management  Relapse prevention      Assessment:   Stages of Change  Model  Action    Appears/Sounds:  Cooperative  Motivated  Engaged      Plan:  Focus on recovery environment  Monitor emotional/physical health  Gain a mental health therapist for individual sessions.  Continue to work towards treatment plan goals.    VANDANA Lord

## 2021-09-03 NOTE — PROGRESS NOTES
"Group Therapy Documentation     Was the patient seen in-person or via Telemedicine (if in-person skip to Group Note): Telemedicine    If Telemedicine: The patient's condition can be safely assessed and treated via synchronous audio and visual telemedicine encounter. ? ?      Reason for Telemedicine Visit:? Due to Covid, only offering telehealth at this time.    Originating Site (Patient Location):  Home    Distant Site (Provider Location): Counselor off site    Consent: ?The patient/guardian has verbally consented to: the potential risks and benefits of telemedicine (video visit) versus in person care; bill my insurance or make self-payment for services provided; and responsibility for payment of non-covered services.       Mode of Communication:??Video Conference via Zoom      As the provider I attest to compliance with applicable laws and regulations related to telemedicine.        Patient attended a video group session on the following days: ?          GROUP NOTE:  DATE OF SERVICE:  September 2, 2021    START TIME:  5:30pm    END TIME:  7:21 PM    FACILITATOR(S):    VANDANA Lord, [facility] [place]    TOPIC: BEH Group Therapy, Problem Gambling Group     Number of patients attending the group: 11    Group Length:   111 minutes    Group Therapy Type:  Problem Gambling Group    Group Attendance:  Client attended group     Summary of Group / Topics Discussed: Group members checked in sharing a feeling.  Counselor followed up from previous group topic on leisure and balance and PT's shared leisure activities they engaged in since last group.  One PT shared completed assignment \"Who are you\" and \"Explore the Real You\"  and another PT shared \"Setting and Pursuing Goals in Recovery\".  Group engaged in feedback to presenting PT's. Group then discussed progress in their recovery, some PT's shared their experience.     Patient's response to the group topic/interactions:  PT gained insight into how addictive behaviors " impact self-esteem and the importance of identifying goals in recovery.       Client specific details: PT shared her continued motivation and positive mindset.  PT is an active listener in group processes.

## 2021-09-14 ENCOUNTER — HOSPITAL ENCOUNTER (OUTPATIENT)
Dept: BEHAVIORAL HEALTH | Facility: CLINIC | Age: 46
End: 2021-09-14
Attending: FAMILY MEDICINE
Payer: COMMERCIAL

## 2021-09-14 PROCEDURE — 90853 GROUP PSYCHOTHERAPY: CPT | Mod: GT | Performed by: COUNSELOR

## 2021-09-15 NOTE — PROGRESS NOTES
Group Therapy Documentation     Was the patient seen in-person or via Telemedicine (if in-person skip to Group Note): Telemedicine    If Telemedicine: The patient's condition can be safely assessed and treated via synchronous audio and visual telemedicine encounter. ? ?      Reason for Telemedicine Visit:? Due to Covid, only offering telehealth at this time.    Originating Site (Patient Location):  Home    Distant Site (Provider Location): Counselor off site    Consent: ?The patient/guardian has verbally consented to: the potential risks and benefits of telemedicine (video visit) versus in person care; bill my insurance or make self-payment for services provided; and responsibility for payment of non-covered services.       Mode of Communication:??Video Conference via Zoom      As the provider I attest to compliance with applicable laws and regulations related to telemedicine.        Patient attended a video group session on the following days: ?          GROUP NOTE:  DATE OF SERVICE:  September 14, 2021    START TIME:  5:30pm    END TIME:  7:30pm    FACILITATOR(S):    Emily Crowley MS, VANDANA, ICGC-II    TOPIC: BEH Group Therapy, Problem Gambling Group     Number of patients attending the group: 8    Group Length:   120    Group Therapy Type:  Problem Gambling Group    Group Attendance:  Client attended group     Summary of Group / Topics Discussed: GA Speaker presented her story to group, answered questions.  Group members shared what brought them to group, how they were doing in recovery, and any challenges they were facing.     Patient's response to the group topic/interactions:  Patient seemed to benefit from hearing a story of recovery.      Client specific details:   Patient shared how she feels she has benefited from the program and how she would benefit from quitting smoking.

## 2021-09-16 ENCOUNTER — HOSPITAL ENCOUNTER (OUTPATIENT)
Dept: BEHAVIORAL HEALTH | Facility: CLINIC | Age: 46
End: 2021-09-16
Attending: FAMILY MEDICINE
Payer: COMMERCIAL

## 2021-09-16 PROCEDURE — 90853 GROUP PSYCHOTHERAPY: CPT | Mod: 95

## 2021-09-17 NOTE — PROGRESS NOTES
Group Therapy Documentation     Was the patient seen in-person or via Telemedicine (if in-person skip to Group Note): Telemedicine    If Telemedicine: The patient's condition can be safely assessed and treated via synchronous audio and visual telemedicine encounter. ? ?      Reason for Telemedicine Visit:? Due to Covid, only offering telehealth at this time.    Originating Site (Patient Location):  Home    Distant Site (Provider Location): Counselor off site    Consent: ?The patient/guardian has verbally consented to: the potential risks and benefits of telemedicine (video visit) versus in person care; bill my insurance or make self-payment for services provided; and responsibility for payment of non-covered services.       Mode of Communication:??Video Conference via Zoom      As the provider I attest to compliance with applicable laws and regulations related to telemedicine.        Patient attended a video group session on the following days: ?          GROUP NOTE:  DATE OF SERVICE:  September 16, 2021    START TIME:  5:30pm    END TIME: 7:26 PM    FACILITATOR(S):    VANDANA Lord, [facility]      TOPIC: BEH Group Therapy, Problem Gambling Group     Number of patients attending the group: 8    Group Length:   86 minutes    Group Therapy Type:  Problem Gambling Group    Group Attendance:  Client attended group     Summary of Group / Topics Discussed: Group checked in with any events from the day/week.  Group continued discussion on shame, shaming messages from others and the continued effects of shame and how shame impacts others.   Group also discussed emotional vulnerability, building connections, and practicing forgiveness, building self-esteem and learning to trust again.  Group discussed the shame associated with addiction also.     Patient's response to the group topic/interactions:  PT appeared to gain insight into the effects of shame and strategies to overcome feelings of shame.      Client  specific details:   PT shared her recovery is going well and continues to work on her relationship with her brother.  PT shared her ongoing work with finding forgiveness within herself.  PT reported having a job interview.

## 2021-09-18 ENCOUNTER — HEALTH MAINTENANCE LETTER (OUTPATIENT)
Age: 46
End: 2021-09-18

## 2021-09-19 NOTE — PROGRESS NOTES
Patient:  Malaika Anne                       Date of Assessment: 6/3/2021             Problem Gambling Progress Note and Treatment Plan Review      Attendance:  Group Tuesday & Thursday nights via Zoom.  Due to high Covid rates, group is only being held via Zoom at this time.    Attendance:  Patient attended group 09/14/21 & 09/16/21.  No individual sessions.      Support group attended this week: Unknown     Reporting sobriety:  yes     Client Treatment Goals:   1. Have the ability to identify and control triggers and urges to padilla.  2. Develop copings skills  3. Rebuild relationships with family and friends.     Treatment Plan      Treatment Plan Review competed on: 09/17/21     Staff Members contributing:    Kristi Camarillo, LADC, [facility] & Emily Crowley, MS, LADC, ICGC[facility]                         Received Supervision:  yes     Client: contributed to goals and plan.  Client received copy of plan/revised plan: Yes  Client agrees with plan/revised plan: Yes     Changes to Treatment Plan: No  New Goals added since last review:  No additional goals added at this time.     Goals worked on since last review:  DM1: Patient reporting sobriety from gambling at this time.  DM2: Patient reports her ability to navigate the health care system independently.    DM3:    Group discussion on shame, shaming messages from others and the continued effects of shame and how shame impacts others.   Group also discussed emotional vulnerability, building connections, and practicing forgiveness, building self-esteem and learning to trust again.  Group discussed the shame associated with addiction.  DM4:  Reading on [employer] in group.   DM5:  Discussion was held on cross addictions and transferring addictions to shopping, CandyCrush, and other behaviors.  DM6:  Patient was again introduced to the benefits of Gamblers Anonymous through a speaker who shared her journey of addiction and recovery.  Patient was able to learn about how to  obtain a GA sponsor, groups to attend, and an upcoming GA conference being held in the [facility].     Strategies effective: yes     Strategies need these changes: Build on daily structure and balance, in addition to building on supports in the community.     Depression and Anxiety at Intake:    Patient reported experiencing signs of depression and will talk to a counselor once she has insurance.    Patient's PHQ-9 score was 23 out of 27, indicating severe depression on 05/25/21 and had dropped to 16/27, no longer reporting suicidal ideation at today's eval.  Patient's ADIEL-7 score was 15, indicating severe anxiety on 05/25/21 and dropped to an 8 and showing mild anxiety during today's eval.  Patient denied suicide attempts in the past.      Intake Dimension Ratings:    Dimension 1: 0     Dimension 2: 0     Dimension 3: 2     Dimension 4: 1     Dimension 5: 2     Dimension 6: 3      Patient's/client's current risk rating:  Low Risk  PT denied any gambling.  Safety Plan on File  No risk identified.     Family Involvement:   Phase II     Data:   Offered feedback   Client did actively participate     Intervention:   Counselor feedback  Emotional management  Relapse prevention     Assessment:   Stages of Change Model:  Maintenance     Appears/Sounds:  Cooperative  Motivated  Engaged     Plan:  Focus on recovery environment  Monitor emotional/physical health  Continue working towards treatment plan goals.  Patient would benefit from employment conducive to recovery.     Emily Crowley MS, LADC, ICGC-II

## 2021-09-19 NOTE — ADDENDUM NOTE
Encounter addended by: Emily Crowley LADC on: 9/19/2021 1:43 PM   Actions taken: Clinical Note Signed

## 2021-09-21 ENCOUNTER — TELEPHONE (OUTPATIENT)
Dept: BEHAVIORAL HEALTH | Facility: CLINIC | Age: 46
End: 2021-09-21

## 2021-09-21 NOTE — TELEPHONE ENCOUNTER
Patient emailed to state that she would not be in group this evening due to a new job. She has talked to her supervisor and will try to get hours adjusted for next week.  She is planning on being at group Thursday.    Emily Crowley MS, LADC, ICGC-II

## 2021-09-23 ENCOUNTER — HOSPITAL ENCOUNTER (OUTPATIENT)
Dept: BEHAVIORAL HEALTH | Facility: CLINIC | Age: 46
End: 2021-09-23
Attending: FAMILY MEDICINE
Payer: COMMERCIAL

## 2021-09-23 PROCEDURE — 90853 GROUP PSYCHOTHERAPY: CPT | Mod: 95

## 2021-09-24 NOTE — PROGRESS NOTES
Group Therapy Documentation     Was the patient seen in-person or via Telemedicine (if in-person skip to Group Note): Telemedicine    If Telemedicine: The patient's condition can be safely assessed and treated via synchronous audio and visual telemedicine encounter. ? ?      Reason for Telemedicine Visit:? Due to Covid, only offering telehealth at this time.    Originating Site (Patient Location):  Home    Distant Site (Provider Location): Counselor off site    Consent: ?The patient/guardian has verbally consented to: the potential risks and benefits of telemedicine (video visit) versus in person care; bill my insurance or make self-payment for services provided; and responsibility for payment of non-covered services.       Mode of Communication:??Video Conference via Zoom      As the provider I attest to compliance with applicable laws and regulations related to telemedicine.        Patient attended a video group session on the following days: ?          GROUP NOTE:  DATE OF SERVICE:  September 23, 2021    START TIME:  5:30pm    END TIME:  7:24 PM    FACILITATOR(S):    VANDANA Lord, [facility] [place]    TOPIC: BEH Group Therapy, Problem Gambling Group     Number of patients attending the group: 7    Group Length:   114 minutes    Group Therapy Type:  Problem Gambling Group    Group Attendance:  Client attended group     Summary of Group / Topics Discussed: Group checked in sharing how they were feeling. Group topics discussed were the physical and emotional effects of fear, stress and how fear impacts recovery.  Also discussed were fear of failure, fear of feeling. fear of success and how self-sabotaging thoughts and behaviors can impact success.  PT's discussed success and self-expe ctations as well as change.  Levels of fear were also discussed.       Patient's response to the group topic/interactions:  PTs appeared to gain insight into fear of changing and how it may impact recovery.       Client specific  details:  PT shared she started working this week and her employer is working with her to attend groups.  PT actively shared and participated in group discussion.

## 2021-09-24 NOTE — ADDENDUM NOTE
Encounter addended by: Kristi Camarillo LADC on: 9/23/2021 8:49 PM   Actions taken: Flowsheet accepted

## 2021-09-27 NOTE — ADDENDUM NOTE
Encounter addended by: Kristi Camarillo LADC on: 9/27/2021 12:39 PM   Actions taken: Clinical Note Signed

## 2021-09-27 NOTE — PROGRESS NOTES
Patient:  Malaika Anne                         Date of Assessment: 6/3/2021            Problem Gambling Progress Note and Treatment Plan Review     Attendance  Group/Individual: Phase II.  PT attends group via video conference.     Groups Attended: 9/23/2021    Support group attended this week: no    Reporting sobriety:  yes    Client Treatment Goals:   Client Treatment Goals:   1. Have the ability to identify and control triggers and urges to padilla.  2. Develop copings skills  3. Rebuild relationships with family and friends.      Treatment Plan     Treatment Plan Review competed on: September 27, 2021    Staff Members contributing:  Kristi Camarillo, CANDIDAC, [facility] & Emily Crowley MA, VANDANA, [facility]                          Received Supervision:  yes    Client: contributed to goals and plan.  Client received copy of plan/revised plan: Yes  Client agrees with plan/revised plan: Yes    Changes to Treatment Plan: Yes - intervention  New Goals added since last review:  No additional goals added at this time.  Intervention:  PT gained active employment.    Goals worked on since last review:  Dimension 1 PT denies any gambling behaviors at this time.  Dimension 2 PT reports her ability to navigate the health care system independently.  Dimension 3 PT attended group discussion on the physical and emotional effects of fear, stress and how fear impacts recovery.  Also discussed were fear of failure, fear of feeling. fear of success and how self-sabotaging thoughts and behaviors can impact success.    Dimension 4 PT continues to be an active participant in group and treatment processes.   Dimension 5 PT continues to strengthen her recovery and work to utilize strategies and coping skills to rpevent relapse at this time.  Dimension 6 PT reports she has gained employment and is working to implement balance at this time.    Strategies effective: yes    Strategies need these changes: Build on daily structure and balance,  in addition to building on supports in the community.    Depression and Anxiety at Intake:    Patient reported experiencing signs of depression and will talk to a counselor once she has insurance.    Patient's PHQ-9 score was 23 out of 27, indicating severe depression on 05/25/21 and had dropped to 16/27, no longer reporting suicidal ideation at today's eval.  Patient's ADIEL-7 score was 15, indicating severe anxiety on 05/25/21 and dropped to an 8 and showing mild anxiety during today's eval.  Patient denied suicide attempts in the past.      Intake Dimension Ratings:    Dimension 1: 0     Dimension 2: 0     Dimension 3: 2     Dimension 4: 1     Dimension 5: 2     Dimension 6: 3     Guide to Risk Ratings for Suicidality:   IDEATION: Active thoughts of suicide? INTENT: Intent to follow on suicide? PLAN: Plan to follow through on suicide? Level of Risk:   IF Yes Yes Yes Patient = High Emergent   IF Yes Yes No Patient = High Urgent/Non-Emergent   IF Yes No No Patient = Moderate Non-Urgent   IF No No   No Patient = Low Risk   The patient's ADDITIONAL RISK FACTORS and lack of PROTECTIVE FACTORS may increase their overall suicide risk ratings.     Patient's/client's current risk rating:  Low Risk  No gambling behaviors  Safety Plan on File  No risk identified    Family Involvement:   Phase II    Data:   offered feedback client did actively participate Continues to gain insight      Intervention:   Counselor feedback  Emotional management  Relapse prevention      Assessment:   Stages of Change Model  Action    Appears/Sounds:  Cooperative  Motivated  Engaged      Plan:  Focus on recovery environment  Monitor emotional/physical health  Continue to work towards treatment plan goals.    Kristi Camarillo, [facility]

## 2021-09-28 ENCOUNTER — TELEPHONE (OUTPATIENT)
Dept: BEHAVIORAL HEALTH | Facility: CLINIC | Age: 46
End: 2021-09-28

## 2021-09-28 NOTE — TELEPHONE ENCOUNTER
Patient reported she would not be at group this evening as she was able to  a shift and stated she needed the money.    Emily Crowley MS, LADC, ICGC-II

## 2021-09-30 ENCOUNTER — HOSPITAL ENCOUNTER (OUTPATIENT)
Dept: BEHAVIORAL HEALTH | Facility: CLINIC | Age: 46
End: 2021-09-30
Attending: FAMILY MEDICINE
Payer: COMMERCIAL

## 2021-09-30 PROCEDURE — 90853 GROUP PSYCHOTHERAPY: CPT | Mod: GT

## 2021-10-01 NOTE — PROGRESS NOTES
Group Therapy Documentation     Was the patient seen in-person or via Telemedicine (if in-person skip to Group Note): Telemedicine    If Telemedicine: The patient's condition can be safely assessed and treated via synchronous audio and visual telemedicine encounter. ? ?      Reason for Telemedicine Visit:? Due to Covid, only offering telehealth at this time.    Originating Site (Patient Location):  Home    Distant Site (Provider Location): Counselor off site    Consent: ?The patient/guardian has verbally consented to: the potential risks and benefits of telemedicine (video visit) versus in person care; bill my insurance or make self-payment for services provided; and responsibility for payment of non-covered services.       Mode of Communication:??Video Conference via Zoom      As the provider I attest to compliance with applicable laws and regulations related to telemedicine.        Patient attended a video group session on the following days: ?          GROUP NOTE:  DATE OF SERVICE:  September 30, 2021    START TIME:  5:30pm    END TIME:  7:20 PM      FACILITATOR(S):    VANDANA Lord, [facility]    TOPIC: BEH Group Therapy, Problem Gambling Group     Number of patients attending the group:  5    Group Length:   110 minutes    Group Therapy Type:  Problem Gambling Group    Group Attendance:  Client attended group     Summary of Group / Topics Discussed: Group checked in sharing something they tried new or completed in the last week as follow up from the previous weeks group, in addition, PT's shared how they were feeling.  Group discussed communication styles, Passive, Aggressive, Passive-Aggressive and Assertive.  PT shared their communication styles from growing up, currently and changes in their communication they have made and changes they are working on, in addition to intent vs. effect.  Group discussed communication levels and other forms of communication including body language.     Patient's  response to the group topic/interactions:  PT appeared to gain insight into their communication style and patterns including how they identified with the various levels of communication.       Client specific details:  PT reported working extra hours this week and is working to find balance with her new job, reporting an inconsistent schedule, in addition to self care.  PT actively participated in group discussion.

## 2021-10-05 ENCOUNTER — HOSPITAL ENCOUNTER (OUTPATIENT)
Dept: BEHAVIORAL HEALTH | Facility: CLINIC | Age: 46
End: 2021-10-05
Attending: FAMILY MEDICINE
Payer: COMMERCIAL

## 2021-10-05 PROCEDURE — 90853 GROUP PSYCHOTHERAPY: CPT | Mod: GT | Performed by: COUNSELOR

## 2021-10-05 NOTE — PROGRESS NOTES
Patient:  Malaika Anne                         Date of Assessment: 6/3/2021            Problem Gambling Progress Note and Treatment Plan Review     Attendance  Group/Individual: Phase II.  PT attends group via video conference.      Groups Attended: 9/30/2021.    Support group attended this week: no    Reporting sobriety:  yes    Client Treatment Goals:   1. Have the ability to identify and control triggers and urges to padilla.  2. Develop copings skills  3. Rebuild relationships with family and friends.      Treatment Plan     Treatment Plan Review competed on: October 1, 2021    Staff Members contributing:  Kristi Camarillo, LADC, [facility] & Emily Crowley MA, LADDANIKA, ICGC-                          Received Supervision:  yes    Client: contributed to goals and plan.  Client received copy of plan/revised plan: Yes  Client agrees with plan/revised plan: Yes    Changes to Treatment Plan: No  New Goals added since last review:  No additional goals added at this time.    Goals worked on since last review:  Dimension 1 PT reports continued abstinence from gambling at this time.  Dimension 2 PT reports her ability to navigate the health care system independently.  Dimension 3 PT continues to report a positive attitude in her recovery at this time.  PT attended group identifying communication patterns including body language and how these patterns changed in recovery. PT reports she is working to gain a therapist for individual sessions at this time.  Dimension 4 PT continues to be an active participant in group and treatment processes.  Dimension 5 PT continues to build and strengthen her recovery and utilize learned skills.  Dimension 6 PT reports continued employment and continues to work to implement self care and balance into daily routine.    Strategies effective: yes    Strategies need these changes: Continue to build on daily structure and balance and build on recovery supports in the community.    Depression  and Anxiety at Intake:    Patient reported experiencing signs of depression and will talk to a counselor once she has insurance.    Patient's PHQ-9 score was 23 out of 27, indicating severe depression on 05/25/21 and had dropped to 16/27, no longer reporting suicidal ideation at today's eval.  Patient's ADIEL-7 score was 15, indicating severe anxiety on 05/25/21 and dropped to an 8 and showing mild anxiety during today's eval.  Patient denied suicide attempts in the past.      Intake Dimension Ratings:    Dimension 1: 0     Dimension 2: 0     Dimension 3: 2     Dimension 4: 1     Dimension 5: 2     Dimension 6: 3         Guide to Risk Ratings for Suicidality:   IDEATION: Active thoughts of suicide? INTENT: Intent to follow on suicide? PLAN: Plan to follow through on suicide? Level of Risk:   IF Yes Yes Yes Patient = High Emergent   IF Yes Yes No Patient = High Urgent/Non-Emergent   IF Yes No No Patient = Moderate Non-Urgent   IF No No   No Patient = Low Risk   The patient's ADDITIONAL RISK FACTORS and lack of PROTECTIVE FACTORS may increase their overall suicide risk ratings.     Patient's/client's current risk rating:  Low Risk  PT denied gambling  Safety Plan on File  No risk identified    Family Involvement:   Phase II    Data:   offered feedback good insight client did actively participate      Intervention:   Counselor feedback  Education  Group feedback  Relapse prevention      Assessment:   Stages of Change Model  Action    Appears/Sounds:  Cooperative  Motivated  Engaged      Plan:  Focus on recovery environment  Monitor emotional/physical health  Continue to work towards treatment plan goals.    [facility]

## 2021-10-05 NOTE — ADDENDUM NOTE
Encounter addended by: Kristi Camarillo [facility] on: 10/5/2021 2:39 PM   Actions taken: Clinical Note Signed

## 2021-10-06 NOTE — PROGRESS NOTES
"Group Therapy Documentation     Was the patient seen in-person or via Telemedicine (if in-person skip to Group Note): Telemedicine    If Telemedicine: The patient's condition can be safely assessed and treated via synchronous audio and visual telemedicine encounter. ? ?      Reason for Telemedicine Visit:? Due to Covid, only offering telehealth at this time.    Originating Site (Patient Location):  Home    Distant Site (Provider Location): Counselor off site    Consent: ?The patient/guardian has verbally consented to: the potential risks and benefits of telemedicine (video visit) versus in person care; bill my insurance or make self-payment for services provided; and responsibility for payment of non-covered services.       Mode of Communication:??Video Conference via Zoom      As the provider I attest to compliance with applicable laws and regulations related to telemedicine.        Patient attended a video group session on the following days: ?          GROUP NOTE:  DATE OF SERVICE:  October 5, 2021    START TIME:  5:30pm    END TIME:  7:30pm    FACILITATOR(S):    Emily Crowley MS, VANDANA, ICGC-II    TOPIC: BEH Group Therapy, Problem Gambling Group     Number of patients attending the group: 9    Group Length:   120 mins    Group Therapy Type:  Problem Gambling Group    Group Attendance:  Client attended group     Summary of Group / Topics Discussed: Patients checked in on they were doing in recovery.  One patient presented his \"Trigger inventory\" and the group discussed coping skills as the patient did not seem to have many readily available for his triggers.  Group ended with a Virtues reading on \"Gratitude\" and a short discussion on the benefit of gratitude journals.      Patient's response to the group topic/interactions:  Patient was able to share feelings about current recovery and strengthen understanding of coping skills available.      Patient's individual response to group:  Patient shared that she is " enjoying being back at work and that it is good to have a schedule again.

## 2021-10-07 ENCOUNTER — TELEPHONE (OUTPATIENT)
Dept: BEHAVIORAL HEALTH | Facility: CLINIC | Age: 46
End: 2021-10-07

## 2021-10-08 NOTE — TELEPHONE ENCOUNTER
PT sent writer Email reporting she arrived home late from work and not feeling well and would not be attending group.

## 2021-10-10 NOTE — PROGRESS NOTES
Patient:  Malaika Anne                       Date of Assessment: 6/3/2021             Problem Gambling Progress Note and Treatment Plan Review      Attendance  Phase II - Group Tuesday & Thursday nights via Zoom.  Due to high Covid rates, group is only being held via Zoom at this time.     Groups Attended: Patient attended group 10/10/21.  No individual sessions this week.     Support group attended this week: no     Reporting sobriety:  yes     Client Treatment Goals:   1. Have the ability to identify and control triggers and urges to padilla.  2. Develop copings skills  3. Rebuild relationships with family and friends.        Treatment Plan      Treatment Plan Review competed on: 10/10/21     Staff Members contributing:    Kristi Camarillo, LADC, [facility] & Emily Crowley, MS, LADC, ICGC-II                         Received Supervision:  yes     Client: contributed to goals and plan.  Client received copy of plan/revised plan: Yes  Client agrees with plan/revised plan: Yes     Changes to Treatment Plan: No  New Goals added since last review:  No additional goals added at this time.     Goals worked on since last review:  DM1: Patient reporting sobriety from gambling at this time.  DM2: Patient reports her ability to navigate the health care system independently.    DM3:  Group discussion skills.  DM4:   Virtue reading in group on  Gratitude .  DM5:  Patient continues to strengthen their recovery.  DM6:  Patient encouraged to attend GA or another recovery program, community, or social group.   Strategies effective: yes     Strategies need these changes: Build on daily structure and balance, in addition to building on supports in the community.     Depression and Anxiety at Intake:    Patient reported experiencing signs of depression and will talk to a counselor once she has insurance.    Patient's PHQ-9 score was 23 out of 27, indicating severe depression on 05/25/21 and had dropped to 16/27, no longer reporting  suicidal ideation at today's eval.  Patient's ADIEL-7 score was 15, indicating severe anxiety on 05/25/21 and dropped to an 8 and showing mild anxiety during today's eval.  Patient denied suicide attempts in the past.      Intake Dimension Ratings:    Dimension 1: 0     Dimension 2: 0     Dimension 3: 2     Dimension 4: 1     Dimension 5: 2     Dimension 6: 3      Patient's/client's current risk rating:  Low Risk  PT denied any gambling.  Safety Plan on File  No risk identified.     Family Involvement:   INESSA signed     Data:   offered feedback client did actively participate     Intervention:   Counselor feedback  Emotional management  Relapse prevention     Assessment:   Stages of Change Model:  Maintenance     Appears/Sounds:  Cooperative  Motivated  Engaged     Plan:  Focus on recovery environment  Monitor emotional/physical health  Continue working towards treatment plan goals.     Emily Crowley MS, LADC, ICGC-II

## 2021-10-10 NOTE — ADDENDUM NOTE
Encounter addended by: Emily Crowley LADC on: 10/10/2021 3:26 PM   Actions taken: Clinical Note Signed

## 2021-10-14 ENCOUNTER — TELEPHONE (OUTPATIENT)
Dept: BEHAVIORAL HEALTH | Facility: CLINIC | Age: 46
End: 2021-10-14

## 2021-10-14 NOTE — TELEPHONE ENCOUNTER
PT contacted phyllisr via email reporting she is struggling with sciatic pain and obligations with work. PT reported having a doctor appointment on Monday for the pain.  PT denied experiencing any urges, reporting she was taking self care and would not be in group.  Writer responded requesting individual counseling session to check in with PT.

## 2021-10-18 NOTE — TELEPHONE ENCOUNTER
PT scheduled individual counseling session with writer via Email for Wednesday, October 27, 2021 @ 9:00 AM.

## 2021-10-21 ENCOUNTER — HOSPITAL ENCOUNTER (OUTPATIENT)
Dept: BEHAVIORAL HEALTH | Facility: CLINIC | Age: 46
End: 2021-10-21
Attending: FAMILY MEDICINE
Payer: COMMERCIAL

## 2021-10-21 PROCEDURE — 90853 GROUP PSYCHOTHERAPY: CPT | Mod: GT,95 | Performed by: COUNSELOR

## 2021-10-21 NOTE — PROGRESS NOTES
"Group Therapy Documentation     Was the patient seen in-person or via Telemedicine (if in-person skip to Group Note): Telemedicine    If Telemedicine: The patient's condition can be safely assessed and treated via synchronous audio and visual telemedicine encounter. ? ?      Reason for Telemedicine Visit:? Due to Covid, only offering telehealth at this time.    Originating Site (Patient Location):  Home    Distant Site (Provider Location): Counselor off site    Consent: ?The patient/guardian has verbally consented to: the potential risks and benefits of telemedicine (video visit) versus in person care; bill my insurance or make self-payment for services provided; and responsibility for payment of non-covered services.       Mode of Communication:??Video Conference via Zoom      As the provider I attest to compliance with applicable laws and regulations related to telemedicine.        Patient attended a video group session on the following days: ?          GROUP NOTE:  DATE OF SERVICE:  October 21, 2021    START TIME:  5:30pm    END TIME:  7:25pm    FACILITATOR(S):    Emily Crowley MS, LADC, ICGC-II & MURRAY Hinton, [facility]    TOPIC: BEH Group Therapy, Problem Gambling Group     Number of patients attending the group: 9    Group Length:   115 mins    Group Therapy Type:  Problem Gambling Group    Group Attendance:  Client attended group     Summary of Group / Topics Discussed: Group checked in and shared with the new counselor how they are benefiting from group and any other feedback.  Group members shared input.  Group watched video \"Big Manuel\" on the progression of gambling addiction from Gamblers Anonymous.  Discussion was held on the video.  Group ended with a Virtues reading on \"Resilience\".     Patient's response to the group topic/interactions:  Patient gained in understanding of gambling as an addiction.      Client specific details:   Patient shared that she was depressed for about two weeks. She was asked to " stay and meet with counselors after group closed. Patient reported no suicidal ideation at this time.  Patient reported having an individual session scheduled with counselor for next Wednesday.  Patient started on Gabapentin for pain which she reports is helping.  Patient encouraged to attend group, make amends with her brother, and talk to doctor about pain and exercise.  Patient would benefit from a referral to an outside therapist and possible medication for depression if it continues.

## 2021-10-21 NOTE — PROGRESS NOTES
"Group Therapy Documentation     Was the patient seen in-person or via Telemedicine (if in-person skip to Group Note): Telemedicine    If Telemedicine: The patient's condition can be safely assessed and treated via synchronous audio and visual telemedicine encounter. ? ?      Reason for Telemedicine Visit:? Due to Covid, only offering telehealth at this time.    Originating Site (Patient Location):  Home    Distant Site (Provider Location): Counselor off site    Consent: ?The patient/guardian has verbally consented to: the potential risks and benefits of telemedicine (video visit) versus in person care; bill my insurance or make self-payment for services provided; and responsibility for payment of non-covered services.       Mode of Communication:??Video Conference via Zoom      As the provider I attest to compliance with applicable laws and regulations related to telemedicine.        Patient attended a video group session on the following days: ?          GROUP NOTE:  DATE OF SERVICE:  October 21, 2021    START TIME:  5:30pm    END TIME:  7:25pm    FACILITATOR(S):    Emily Crowley MS, LADC, ICGC-II & MURRAY Hinton, [facility] [place]    TOPIC: BEH Group Therapy, Problem Gambling Group     Number of patients attending the group: 9    Group Length:   115 mins    Group Therapy Type:  Problem Gambling Group    Group Attendance:  Client attended group     Summary of Group / Topics Discussed: Group checked in and shared with the new counselor how they are benefiting from group and any other feedback.  Group members shared input.  Group watched video \"Big Manuel\" on the progression of gambling addiction from Gamblers Anonymous.  Discussion was held on the video.  Group ended with a Virtues reading on \"Resilience\".     Patient's response to the group topic/interactions:  Patient gained in understanding of gambling as an addiction.      Client specific details:   Patient shared that she had been missing group due to isolating and " depression.  Patient was asked to stay after group and check in with counselors.  Patient stayed and reported that she is not experiencing suicidal ideation at this time, but depression for about two weeks. She has started on Gabapentin for pain and that is helping.  She has an individual session with Counselor on Wednesday.  She needs referrals to individual and possible medication management for outside providers. She was encouraged to continue attending group regularly, make amends with her brother, and talk to her doctor about her pain so she can continue exercising.

## 2021-10-26 ENCOUNTER — HOSPITAL ENCOUNTER (OUTPATIENT)
Dept: BEHAVIORAL HEALTH | Facility: CLINIC | Age: 46
End: 2021-10-26
Attending: FAMILY MEDICINE
Payer: COMMERCIAL

## 2021-10-26 PROCEDURE — 90853 GROUP PSYCHOTHERAPY: CPT | Mod: GT,95 | Performed by: COUNSELOR

## 2021-10-27 ENCOUNTER — HOSPITAL ENCOUNTER (OUTPATIENT)
Dept: BEHAVIORAL HEALTH | Facility: CLINIC | Age: 46
End: 2021-10-27
Attending: FAMILY MEDICINE
Payer: COMMERCIAL

## 2021-10-27 PROCEDURE — 90832 PSYTX W PT 30 MINUTES: CPT | Mod: GT,95

## 2021-10-27 NOTE — PROGRESS NOTES
Individual counseling session:    Telemedicine Visit: The patient's condition can be safely assessed and treated via synchronous audio and visual telemedicine encounter.      Reason for Telemedicine Visit: Services only offered telehealth    Originating Site (Patient Location): Patient's home    Distant Site (Provider Location): Provider Remote Setting- Home Office    Consent:  The patient/guardian has verbally consented to: the potential risks and benefits of telemedicine (video visit) versus in person care; bill my insurance or make self-payment for services provided; and responsibility for payment of non-covered services.     Mode of Communication:  Video Conference via Savveo    As the provider I attest to compliance with applicable laws and regulations related to telemedicine.    Counselor verified Patient with 2-point verification: Patient is known to provider.    Video visit start time: 9:04 AM.  Writer and PT continued to have connection issues and able to connect at 9:10 AM  Video visit end time: 9:43 AM    Length of session: 33 minutes of connection    D: Writer met with PT for scheduled individual counseling session via video conference.  PT reported feeling disconnected from her recovery and others the past two weeks reporting physical pain.  PT reported reengaging in group and meeting with counselor following group and reports she is feeling better about her recovery.  PT identified the need to gain a therapist in the community.  PT agreed to meet with writer for individual counseling sessions until PT gains therapist.  PT able to identify patterns of isolation and triggers leading to lower mood.  PT reports having scheduled appointment with Physical medicine providers for leg pain.  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  PT denied any suicide and/or self harm ideation, plans or attempts.  I: Counselor facilitated 1:1 session.  A: PT appears motivated and engaged in her recovery.  P:  PT to continue to attend group, meet with counselor for individual counseling sessions and gain a mental health therapist in the community.    Kristi Camarillo, VANDANA, CGC

## 2021-10-27 NOTE — PROGRESS NOTES
"Group Therapy Documentation     Was the patient seen in-person or via Telemedicine (if in-person skip to Group Note): Telemedicine    If Telemedicine: The patient's condition can be safely assessed and treated via synchronous audio and visual telemedicine encounter. ? ?      Reason for Telemedicine Visit:? Due to Covid, only offering telehealth at this time.    Originating Site (Patient Location):  Home    Distant Site (Provider Location): Counselor off site    Consent: ?The patient/guardian has verbally consented to: the potential risks and benefits of telemedicine (video visit) versus in person care; bill my insurance or make self-payment for services provided; and responsibility for payment of non-covered services.       Mode of Communication:??Video Conference via Zoom      As the provider I attest to compliance with applicable laws and regulations related to telemedicine.        Patient attended a video group session on the following days: ?          GROUP NOTE:  DATE OF SERVICE:  October 26, 2021    START TIME:  5:30pm    END TIME:  7:03pm    FACILITATOR(S):    Emily Crowley MS, [facility], ICGC-[facility]    TOPIC: BEH Group Therapy, Problem Gambling Group     Number of patients attending the group: 7     Group Length:   93 mins    Group Therapy Type:  Problem Gambling Group    Group Attendance:  Client attended group     Summary of Group / Topics Discussed: Patients checked in and welcomed a new group member. Went over assignment \"Planning Aftercare\".  Discussion on what goals they made at the beginning of group and what they are still working on now.  Group closed with a Virtues reading on Joyousness.     Patient's response to the group topic/interactions:  Patient seemed to benefit from the discussion on goals, and specific actions they are taking to work towards recovery.       Client specific details:   Patient shared that she is almost ready to reach out to her brother and make amends and start the dialogue again. "

## 2021-10-28 ENCOUNTER — HOSPITAL ENCOUNTER (OUTPATIENT)
Dept: BEHAVIORAL HEALTH | Facility: CLINIC | Age: 46
End: 2021-10-28
Attending: FAMILY MEDICINE
Payer: COMMERCIAL

## 2021-10-28 PROCEDURE — 90853 GROUP PSYCHOTHERAPY: CPT | Mod: GT,95

## 2021-10-29 NOTE — PROGRESS NOTES
Group Therapy Documentation     Was the patient seen in-person or via Telemedicine (if in-person skip to Group Note): Telemedicine    If Telemedicine: The patient's condition can be safely assessed and treated via synchronous audio and visual telemedicine encounter. ? ?      Reason for Telemedicine Visit:? Due to Covid, only offering telehealth at this time.    Originating Site (Patient Location):  Home    Distant Site (Provider Location): Counselor off site    Consent: ?The patient/guardian has verbally consented to: the potential risks and benefits of telemedicine (video visit) versus in person care; bill my insurance or make self-payment for services provided; and responsibility for payment of non-covered services.       Mode of Communication:??Video Conference via Zoom      As the provider I attest to compliance with applicable laws and regulations related to telemedicine.        Patient attended a video group session on the following days: ?          GROUP NOTE:  DATE OF SERVICE:  October 28, 2021    START TIME:  5:30pm    END TIME:  7:22    FACILITATOR(S):    VANDANA Lord, [facility] [place]  Counselor, JUAN RAMON Hinton-T was present during group.    TOPIC: BEH Group Therapy, Problem Gambling Group     Number of patients attending the group: 8    Group Length:   112 minutes    Group Therapy Type:  Problem Gambling Group    Group Attendance:  Client attended group     Summary of Group / Topics Discussed: Group checked in sharing how they were feeling.  Group discussed various aspects of stress, including the emotional and physical aspects of stress. Group discussed breathing techniques for calming, in addition to various calming apps, sleep hygiene, nutrition and exercise in addition to connecting to others.  Group practiced mindfulness and breathing and encouraged to identify a strategy to reduce stress.  Group also discussed journalling and practicing gratitude and navigating negative self talk.     Patient's  response to the group topic/interactions:  PT appeared to gain additional insight into the effects of stressors and stress reducing strategies.      Client specific details:   PT shared how stress impacted her negative self talk and her ability to see how her thought patterns contributed to her low mood.  PT reported feeling better about her recovery and feeling engaged and motivated again.

## 2021-11-01 NOTE — PROGRESS NOTES
"Patient:  Malaika Anne                         Date of Assessment: 6/3/2021            Problem Gambling Progress Note and Treatment Plan Review     Attendance  Group/Individual:Phase II.  PT attends group via video conference.      Groups Attended: 10/21/2021, 10/26/2021, 10/28/2021 and individual counseling session on 10/27/2021.    Support group attended this week: no    Reporting sobriety:  yes    Client Treatment Goals:   1. Have the ability to identify and control triggers and urges to padilla.  2. Develop copings skills  3. Rebuild relationships with family and friends.      Treatment Plan     Treatment Plan Review competed on: November 1, 2021    Staff Members contributing:  Kristi Camarillo, [employer], [facility] & Emily Crowley MA, VANDANA, [facility]                          Received Supervision:  yes    Client: contributed to goals and plan.  Client received copy of plan/revised plan: Yes  Client agrees with plan/revised plan: Yes    Changes to Treatment Plan: No  New Goals added since last review:  No additional goals added at this time.    Goals worked on since last review:  Dimension 1 PT denies any gambling behaviors at this time.  Dimension 2 PT reports working with medical providers regarding ongoing issues related to pain.  PT reports her ability to navigate the health care system independently.    Dimension 3 PT reported changes in her mental health reporting issues related to pain management.  PT denied any suicide and/or self harm ideation, plan or attempts.  PT encouraged to gain a mental health therapist for ongoing support.  PT attended group identifying the impact of stress and strategies to manage stressors.  Dimension 4 PT attended group with video \"Big Manuel\" identifying the progression of problem gambling.  PT continues to be motivated in her recovery and participated in group identifying goals.  Dimension 5 PT continues to build and strengthen her recovery.  PT continues to identify triggers for " gambling such as increased stress, physical pain and feelings of being overwhelmed.  Dimension 6 PT reports continued employment at this time.  PT continues to build on social supports in the community.      Strategies effective: yes    Strategies need these changes: Build on daily balance and increase social supports in the community.       Depression and Anxiety at Intake:    Patient reported experiencing signs of depression and will talk to a counselor once she has insurance.    Patient's PHQ-9 score was 23 out of 27, indicating severe depression on 05/25/21 and had dropped to 16/27, no longer reporting suicidal ideation at today's eval.  Patient's ADIEL-7 score was 15, indicating severe anxiety on 05/25/21 and dropped to an 8 and showing mild anxiety during today's eval.  Patient denied suicide attempts in the past.      Intake Dimension Ratings:    Dimension 1: 0     Dimension 2: 0     Dimension 3: 2     Dimension 4: 1     Dimension 5: 2     Dimension 6: 3       Guide to Risk Ratings for Suicidality:   IDEATION: Active thoughts of suicide? INTENT: Intent to follow on suicide? PLAN: Plan to follow through on suicide? Level of Risk:   IF Yes Yes Yes Patient = High Emergent   IF Yes Yes No Patient = High Urgent/Non-Emergent   IF Yes No No Patient = Moderate Non-Urgent   IF No No   No Patient = Low Risk   The patient's ADDITIONAL RISK FACTORS and lack of PROTECTIVE FACTORS may increase their overall suicide risk ratings.     Patient's/client's current risk rating:  Low Risk  PT denied any gambling.  Safety Plan on File  No risk identified    Family Involvement:   Phase II    Data:   offered feedback client did participate  Continues to gain insight    Intervention:   Counselor feedback  Education  Emotional management  Group feedback  Relapse prevention      Assessment:   Stages of Change Model  Action    Appears/Sounds:  Cooperative  Motivated  Engaged      Plan:  Focus on recovery environment  Monitor  emotional/physical health  Gain a mental health therapist  Continue to work towards treatment plan goals.    VANDANA Lord

## 2021-11-01 NOTE — ADDENDUM NOTE
Encounter addended by: Kristi Camarillo LADC on: 11/1/2021 11:57 AM   Actions taken: Clinical Note Signed

## 2021-11-01 NOTE — TELEPHONE ENCOUNTER
Writer sent PT SECURE Email requesting to schedule individual counseling session as per discussion at last session for onging support.

## 2021-11-02 ENCOUNTER — TELEPHONE (OUTPATIENT)
Dept: BEHAVIORAL HEALTH | Facility: CLINIC | Age: 46
End: 2021-11-02

## 2021-11-03 ENCOUNTER — HOSPITAL ENCOUNTER (OUTPATIENT)
Dept: BEHAVIORAL HEALTH | Facility: CLINIC | Age: 46
End: 2021-11-03
Attending: FAMILY MEDICINE
Payer: COMMERCIAL

## 2021-11-03 PROCEDURE — 90834 PSYTX W PT 45 MINUTES: CPT | Mod: GT,95

## 2021-11-03 NOTE — PROGRESS NOTES
Individual counseling session:    Telemedicine Visit: The patient's condition can be safely assessed and treated via synchronous audio and visual telemedicine encounter.      Reason for Telemedicine Visit: Services only offered telehealth    Originating Site (Patient Location): Patient's home    Distant Site (Provider Location): Provider Remote Setting- Home Office    Consent:  The patient/guardian has verbally consented to: the potential risks and benefits of telemedicine (video visit) versus in person care; bill my insurance or make self-payment for services provided; and responsibility for payment of non-covered services.     Mode of Communication:  Video Conference via [employer] [place]    As the provider I attest to compliance with applicable laws and regulations related to telemedicine.    Counselor verified Patient with 2-point verification: Patient is known to provider.    Video visit start time: 9:00 AM  Video visit end time: 9:43 AM    Length of session: 43 minutes    D: Writer met with PT for scheduled individual counseling session via video conference. PT reports stressors due to possible permanent employment and potential for background check, PT reported she was honest with her employer.  PT and writer discussed self care as PT reports family members struggling and how PT can manage stress, care for others as well as care for herself.  PT shared continued physical pain and has scheduled physical medicine appointment the end of November.  PT reported practicing gratitude daily and reports she is building social connections with past friends and new friends associated with her employment at the [employer].  PT reported feeling her mental health is stable at this time and reports she will begin seeking a mental health therapist.  I: Counselor facilitated 1:1 session.  A: PT appears engaged and motivated in her recovery and appears to continue to identify and implement stress reducing strategies.  P: PT to gain a  mental health therapist, continue to work towards treatment plan goals and continue to meet with counseling staff weekly.    PT ended session due to work schedule. PT communicated this to counselor prior to session.    VANDANA Lord, CGC

## 2021-11-04 ENCOUNTER — HOSPITAL ENCOUNTER (OUTPATIENT)
Dept: BEHAVIORAL HEALTH | Facility: CLINIC | Age: 46
End: 2021-11-04
Attending: FAMILY MEDICINE
Payer: COMMERCIAL

## 2021-11-04 PROCEDURE — 90853 GROUP PSYCHOTHERAPY: CPT | Mod: GT,95

## 2021-11-05 NOTE — PROGRESS NOTES
Group Therapy Documentation     Was the patient seen in-person or via Telemedicine (if in-person skip to Group Note): Telemedicine    If Telemedicine: The patient's condition can be safely assessed and treated via synchronous audio and visual telemedicine encounter. ? ?      Reason for Telemedicine Visit:? Due to Covid, only offering telehealth at this time.    Originating Site (Patient Location):  Home    Distant Site (Provider Location): Counselor off site    Consent: ?The patient/guardian has verbally consented to: the potential risks and benefits of telemedicine (video visit) versus in person care; bill my insurance or make self-payment for services provided; and responsibility for payment of non-covered services.       Mode of Communication:??Video Conference via Zoom      As the provider I attest to compliance with applicable laws and regulations related to telemedicine.        Patient attended a video group session on the following days: ?          GROUP NOTE:  DATE OF SERVICE:  November 4, 2021    START TIME:  5:30pm    END TIME:  7:20 PM    FACILITATOR(S):    VANDANA Lord, [facility] & JUAN RAMON Hinton-T    TOPIC: BEH Group Therapy, Problem Gambling Group     Number of patients attending the group: 8    Group Length:   110 minutes    Group Therapy Type:  Problem Gambling Group    Group Attendance:  Client attended group     Summary of Group / Topics Discussed: Group began with meditation following with discussion on mindfulness and meditation.  Les from Phase III peer led group gave introduction to phase III and shared his story.  Group discussed mindfulness and meditation and various aspect of speaker content, including ways of finding wilton.  A graduating patient shared his completed continuing care plan and coined out, received feedback from peers and counselors.  Group closed on a reading.     Patient's response to the group topic/interactions:  PT's were engaged in meditation and discussion  following and appeared to gain knowledge of Phase III Peer led group.    Client specific details:   PT asked questions of speaker and actively participated in group.

## 2021-11-09 ENCOUNTER — HOSPITAL ENCOUNTER (OUTPATIENT)
Dept: BEHAVIORAL HEALTH | Facility: CLINIC | Age: 46
End: 2021-11-09
Attending: FAMILY MEDICINE
Payer: COMMERCIAL

## 2021-11-09 PROCEDURE — 90853 GROUP PSYCHOTHERAPY: CPT | Mod: GT,95 | Performed by: COUNSELOR

## 2021-11-10 NOTE — PROGRESS NOTES
Group Therapy Documentation     Was the patient seen in-person or via Telemedicine (if in-person skip to Group Note): Telemedicine    If Telemedicine: The patient's condition can be safely assessed and treated via synchronous audio and visual telemedicine encounter. ? ?      Reason for Telemedicine Visit:? Due to Covid, only offering telehealth at this time.    Originating Site (Patient Location):  Home    Distant Site (Provider Location): Counselor off site    Consent: ?The patient/guardian has verbally consented to: the potential risks and benefits of telemedicine (video visit) versus in person care; bill my insurance or make self-payment for services provided; and responsibility for payment of non-covered services.       Mode of Communication:??Video Conference via Zoom      As the provider I attest to compliance with applicable laws and regulations related to telemedicine.        Patient attended a video group session on the following days: ?          GROUP NOTE:  DATE OF SERVICE:  November 9, 2021  START TIME:  5:30pm  END TIME:  7:15pm    FACILITATOR(S):    Emily Crowley MS, LADC, [facility]GC-II, Tere Lo, ADC - T, [facility]    TOPIC: BEH Group Therapy, Problem Gambling Group     Number of patients attending the group: 8    Group Length:   105 minutes  Group Therapy Type:  Problem Gambling Group  Group Attendance:  Client attended group     Summary of Group / Topics Discussed: Group began with an instructional meditation - How to Meditate (The Big Idea). Group participated in discussion on worksheet A Different Approach, patient s then discussed topic of impulsivity. Group ended with positive reading.    Patient's response to the group topic/interactions:  Patient was engaged and shared with group about times of impulsivity and offered feedback to others.    Case specific:  Patient shared with group about stressors at work and offered feedback to others.

## 2021-11-11 ENCOUNTER — HOSPITAL ENCOUNTER (OUTPATIENT)
Dept: BEHAVIORAL HEALTH | Facility: CLINIC | Age: 46
End: 2021-11-11
Attending: FAMILY MEDICINE
Payer: COMMERCIAL

## 2021-11-11 PROCEDURE — 90853 GROUP PSYCHOTHERAPY: CPT | Mod: GT,95
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
## 2021-11-15 NOTE — ADDENDUM NOTE
Encounter addended by: Kristi Camarillo [facility] on: 11/15/2021 2:04 PM   Actions taken: Pend clinical note, Clinical Note Signed

## 2021-11-15 NOTE — PROGRESS NOTES
Patient:  Malaika Anne                         Date of Assessment: 6/3/2021            Problem Gambling Progress Note and Treatment Plan Review     Attendance  Group/Individual: Phase II.  PT attends group via video conference.    Groups Attended: 11/4/2021, 11/9/2021, 11/11/2021.  PT also attended individual counseling session on 11/3/2021.    Support group attended this week: no    Reporting sobriety:  yes    Client Treatment Goals:   1. Have the ability to identify and control triggers and urges to padilla.  2. Develop copings skills  3. Rebuild relationships with family and friends.      Treatment Plan     Treatment Plan Review competed on: November 12, 2021    Staff Members contributing:  Kristi Camarillo, [facility], [facility], Emily Crowley MA, VANDANA, [facility] & Tere Lo, JUAN RAMON-T, [facility]                         Received Supervision:  yes    Client: contributed to goals and plan.  Client received copy of plan/revised plan: Yes  Client agrees with plan/revised plan: Yes    Changes to Treatment Plan: No  New Goals added since last review:  No additional goals added at this time.    Goals worked on since last review:  Dimension 1 PT denied any gambling behaviors.  Dimension 2 PT reports working with medical providers at this time.  PT reports current medication compliance and her ability to navigate the health care system independently.  Dimension 3 PT reports continued efforts regulating her emotions, reporting she continues to keep a positive view on obstacles.  PT continues to encouraged to gain a mental health therapist for ongoing support.  PT attended group on identifying impulsive behaviors and a group on personal morals and values and implementing these into recovery.  PT participated in mindfulness/meditation in group.  Dimension 4 PT attends groups and individual sessions and is an active participant.  Dimension 5 PT continues to gain insight into how her unregulated emotions and mental health are triggers for  urges to padilla.  PT denies any gambling at this time and continues to work towards strengthening her recovery.  Dimension 6 PT reports working full time and continues to work towards daily balance and increasing supports in the community.    Strategies effective: yes    Strategies need these changes: Build on emotional regulation skills, gain a mental health therapist and build on daily balance and self care.    Depression and Anxiety at Intake:    Patient reported experiencing signs of depression and will talk to a counselor once she has insurance.    Patient's PHQ-9 score was 23 out of 27, indicating severe depression on 05/25/21 and had dropped to 16/27, no longer reporting suicidal ideation at today's eval.  Patient's ADIEL-7 score was 15, indicating severe anxiety on 05/25/21 and dropped to an 8 and showing mild anxiety during today's eval.  Patient denied suicide attempts in the past.      Intake Dimension Ratings:    Dimension 1: 0     Dimension 2: 0     Dimension 3: 2     Dimension 4: 1     Dimension 5: 2     Dimension 6: 3       Guide to Risk Ratings for Suicidality:   IDEATION: Active thoughts of suicide? INTENT: Intent to follow on suicide? PLAN: Plan to follow through on suicide? Level of Risk:   IF Yes Yes Yes Patient = High Emergent   IF Yes Yes No Patient = High Urgent/Non-Emergent   IF Yes No No Patient = Moderate Non-Urgent   IF No No   No Patient = Low Risk   The patient's ADDITIONAL RISK FACTORS and lack of PROTECTIVE FACTORS may increase their overall suicide risk ratings.     Patient's/client's current risk rating:  Low Risk  PT denied gambling  Safety Plan on File  No risk identified    Family Involvement:   none schedule this week   Phase II    Data:   offered feedback client did actively participate  Continues to gain insight.    Intervention:   Cognitive Behavioral Therapy  Counselor feedback  Education  Emotional management  Relapse prevention      Assessment:   Stages of Change  Model  Action    Appears/Sounds:  Cooperative  Motivated  Engaged      Plan:  Focus on recovery environment  Monitor emotional/physical health  Gain a mental health therapist  Continue to work towards treatment plan goals.    VANDANA Lord

## 2021-11-16 ENCOUNTER — TELEPHONE (OUTPATIENT)
Dept: BEHAVIORAL HEALTH | Facility: CLINIC | Age: 46
End: 2021-11-16
Payer: COMMERCIAL

## 2021-11-17 ENCOUNTER — HOSPITAL ENCOUNTER (OUTPATIENT)
Dept: BEHAVIORAL HEALTH | Facility: CLINIC | Age: 46
End: 2021-11-17
Attending: FAMILY MEDICINE
Payer: COMMERCIAL

## 2021-11-17 PROCEDURE — 90834 PSYTX W PT 45 MINUTES: CPT | Mod: GT,95
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
## 2021-11-18 ENCOUNTER — HOSPITAL ENCOUNTER (OUTPATIENT)
Dept: BEHAVIORAL HEALTH | Facility: CLINIC | Age: 46
End: 2021-11-18
Attending: FAMILY MEDICINE
Payer: COMMERCIAL

## 2021-11-18 PROCEDURE — 90853 GROUP PSYCHOTHERAPY: CPT | Mod: GT,95

## 2021-11-18 NOTE — ADDENDUM NOTE
Encounter addended by: Kristi Camarillo [facility] on: 11/18/2021 4:52 PM   Actions taken: Flowsheet data copied forward, Flowsheet accepted

## 2021-11-19 NOTE — PROGRESS NOTES
Group Therapy Documentation      Was the patient seen in-person or via Telemedicine (if in-person skip to Group Note): Telemedicine     If Telemedicine: The patient's condition can be safely assessed and treated via synchronous audio and visual telemedicine encounter. ? ?       Reason for Telemedicine Visit: Patient has requested telehealth visit     Originating Site (Patient Location): Patient's home     Distant Site (Provider Location): Provider Remote Setting- Home Office     Consent: ?The patient/guardian has verbally consented to: the potential risks and benefits of telemedicine (video visit) versus in person care; bill my insurance or make self-payment for services provided; and responsibility for payment of non-covered services.        Mode of Communication:??Video Conference via Zoom      As the provider I attest to compliance with applicable laws and regulations related to telemedicine.        Patient attended a video group session on the following days: ?           GROUP NOTE:  DATE OF SERVICE:  November 18, 2021     START TIME:  5:30pm     END TIME:  7:27 PM     Group Length:   118 minutes     FACILITATOR(S):    VANDANA Lord, [facility]     TOPIC: BEH Group Therapy, Problem Gambling Group      Number of patients attending the group: 9     Group Therapy Type:  Problem Gambling Group     Group Attendance:  Client attended group      Summary of Group / Topics Discussed: Group checked in sharing a feeling and any recent events since last group check in.  Group discussed the upcoming holidays and their expectations of them will be in recovery.  PT's shared memories and traditions and expressed what the holidays mean for them and how it can be a stressful time for early recovery,including family and financial stressors.  Group discussed alternatives to gambling during stressors and how expressing and practicing gratitude can be a strategy to navigate stressors and can promote happiness and well being.  " PT's watched short video on \"[place] in Happiness\"  And PT's were encouraged to keep a gratitude journal or jar.     Patient's response to the group topic/interactions:  PT's engaged during discussion and shared their personal experiences, hopes and expectations for the holidays in recovery.      Client specific details:   PT shared she gained permanent employment and shared her planned activities for the holidays and plans to travel to [place] in December to spend time with family.   "

## 2021-11-23 ENCOUNTER — TELEPHONE (OUTPATIENT)
Dept: BEHAVIORAL HEALTH | Facility: CLINIC | Age: 46
End: 2021-11-23
Payer: COMMERCIAL

## 2021-11-30 ENCOUNTER — HOSPITAL ENCOUNTER (OUTPATIENT)
Dept: BEHAVIORAL HEALTH | Facility: CLINIC | Age: 46
End: 2021-11-30
Attending: FAMILY MEDICINE
Payer: COMMERCIAL

## 2021-11-30 PROCEDURE — 90853 GROUP PSYCHOTHERAPY: CPT | Mod: GT,95

## 2021-12-01 NOTE — PROGRESS NOTES
Group Therapy Documentation     Was the patient seen in-person or via Telemedicine (if in-person skip to Group Note): Telemedicine    If Telemedicine: The patient's condition can be safely assessed and treated via synchronous audio and visual telemedicine encounter. ? ?      Reason for Telemedicine Visit: Patient has requested telehealth visit    Originating Site (Patient Location): Patient's home    Distant Site (Provider Location): Provider Remote Setting- Home Office    Consent: ?The patient/guardian has verbally consented to: the potential risks and benefits of telemedicine (video visit) versus in person care; bill my insurance or make self-payment for services provided; and responsibility for payment of non-covered services.       Mode of Communication:??Video Conference via Zoom      As the provider I attest to compliance with applicable laws and regulations related to telemedicine.        Patient attended a video group session on the following days: ?          GROUP NOTE:  DATE OF SERVICE:  November 30, 2021    START TIME:  5:30pm    END TIME:  7:01pm    Group Length:   91 minutes    FACILITATOR(S):    JANELL HintonT, CHRYSTAL    TOPIC: BEH Group Therapy, Problem Gambling Group     Number of patients attending the group: 7    Group Therapy Type:  Problem Gambling Group    Group Attendance:  Client attended group     Summary of Group / Topics Discussed: Group started with meditation  A Habit you Actually Want , followed by discussion. A new patient was introduced to the group. Patient shared a little about his life and his addiction. The group responded with support and all group members shared about themselves and their addiction. The group all shared at check in, with adding in something positive that happened to them since the last group. Two patients presented assignments they had completed. Then the group worked on a new assignment and open discussion on Triggers.  Group ended with a Positive Thinking  reading.      Patient's response to the group topic/interactions:  Patient s were engaged and participated in discussion and offered support to each other.       Client specific details:   Patient shared with the group that she has been hired on full time and she is very excited about it. Also that she got an unexpected promotion. Patient is worried that with the promotion and having more money she will be tempted to padilla.

## 2021-12-01 NOTE — ADDENDUM NOTE
Encounter addended by: Kristi Camarillo [facility] on: 12/1/2021 1:00 PM   Actions taken: Clinical Note Signed

## 2021-12-02 ENCOUNTER — HOSPITAL ENCOUNTER (OUTPATIENT)
Dept: BEHAVIORAL HEALTH | Facility: CLINIC | Age: 46
End: 2021-12-02
Attending: FAMILY MEDICINE
Payer: COMMERCIAL

## 2021-12-02 PROCEDURE — 90853 GROUP PSYCHOTHERAPY: CPT | Mod: GT,95

## 2021-12-03 NOTE — PROGRESS NOTES
.Group Therapy Documentation     Was the patient seen in-person or via Telemedicine (if in-person skip to Group Note): Telemedicine    If Telemedicine: The patient's condition can be safely assessed and treated via synchronous audio and visual telemedicine encounter. ? ?      Reason for Telemedicine Visit: Patient has requested telehealth visit    Originating Site (Patient Location): Patient's home    Distant Site (Provider Location): Provider Remote Setting- Home Office    Consent: ?The patient/guardian has verbally consented to: the potential risks and benefits of telemedicine (video visit) versus in person care; bill my insurance or make self-payment for services provided; and responsibility for payment of non-covered services.       Mode of Communication:??Video Conference via Zoom      As the provider I attest to compliance with applicable laws and regulations related to telemedicine.        Patient attended a video group session on the following days: ?          GROUP NOTE:  DATE OF SERVICE:  December 2, 2021    START TIME:  5:30pm    END TIME:  7:22 PM    Group Length:   112 minutes    FACILITATOR(S):    VANDANA Lord, [facility]    TOPIC: BEH Group Therapy, Problem Gambling Group     Number of patients attending the group: 7    Group Therapy Type:  Problem Gambling Group    Group Attendance:  Client attended group     Summary of Group / Topics Discussed: PT's checked in sharing a feeling.  Group discussion was held on finding calmness and the continued comfort in chaos in daily life and self-sabotaging recovery in chaos.  Group followed with discussion on anger as a trigger for relapse and identifying the underlying emotion to anger and how it has potential to turn into resentments both on self and others.     Patient's response to the group topic/interactions: PT engaged in group discussion on both anger and self-sabotaging behaviors.       Client specific details: PT shared in group her experience  of being angry over the weekend and able to identify her underlying emotion she felt.  PT reported reaching out to her brother and other family members and feeling positive in her recovery.

## 2021-12-21 ENCOUNTER — TELEPHONE (OUTPATIENT)
Dept: BEHAVIORAL HEALTH | Facility: CLINIC | Age: 46
End: 2021-12-21

## 2021-12-21 NOTE — TELEPHONE ENCOUNTER
PT responded to writer and scheduled to meet with counselor on Tuesday 12/22/2021 @ 8:00 AM for counseling session.

## 2021-12-21 NOTE — TELEPHONE ENCOUNTER
Writer sent PT SECURE Email:    Ameya Dai,     If we could please scheduled an individual counseling session to check in, Do you have availability tomorrow morning, Wednesday, December 22, 2021?    Thank you!

## 2021-12-22 ENCOUNTER — HOSPITAL ENCOUNTER (OUTPATIENT)
Dept: BEHAVIORAL HEALTH | Facility: CLINIC | Age: 46
End: 2021-12-22
Attending: FAMILY MEDICINE
Payer: COMMERCIAL

## 2021-12-22 PROCEDURE — 90834 PSYTX W PT 45 MINUTES: CPT | Mod: 95

## 2021-12-22 NOTE — PROGRESS NOTES
Individual counseling session:    Telemedicine Visit: The patient's condition can be safely assessed and treated via synchronous audio and visual telemedicine encounter.      Reason for Telemedicine Visit: Services only offered telehealth    Originating Site (Patient Location): Patient's home    Distant Site (Provider Location): Provider Remote Setting- Home Office    Consent:  The patient/guardian has verbally consented to: the potential risks and benefits of telemedicine (video visit) versus in person care; bill my insurance or make self-payment for services provided; and responsibility for payment of non-covered services.     Mode of Communication:  Video Conference via [employer]    As the provider I attest to compliance with applicable laws and regulations related to telemedicine.    Counselor verified Patient with 2-point verification: Patient is known to provider.    Video visit start time: 8:00 AM  Video visit end time: 8:52 AM    Length of session: 52 minutes    D: Writer met with PT for scheduled individual counseling session.  PT reported feeling overwhelmed due to ongoing issues regarding physical pain, financial responsibilities and increased stress.  PT reported working increased amount of hours, reporting an increase of physical pain.  PT reported having to reschedule physical medicine appointment for early January.  Writer and PT discussed goals to increase connection with others, including both her family and to the group, in addition to increasing self care.  PT will contact primary care physician regarding new pain symptoms and medication and sharing symptoms related to low mood.  PT denied experiencing any suicide and/or self harm ideations, plans or attempts and denied any gambling behaviors.  Discussion on budgeting, writer will send PT information on online tools as well as family means for budgeting assistance.   I: Counselor facilitated 1:1 session.  A: PT presented emotional with sharing  feelings of being overwhelmed and appears motivated to reengage in group and practice increased self care.  P: PT will review budging tools, contact primary care provider for updated appointment, contact physical medicine appointment to be placed on cancellation list for the possibility to be seen sooner and connect with family and group peers.  PT will attend group and continue to meet with counselor for individual counseling sessions.  Counselor to send online tools for budgeting and Family Means.    Kristi Camarillo, VANDANA, CGC

## 2021-12-22 NOTE — TELEPHONE ENCOUNTER
Follow up from individual counseling session, writer sent PT SECURE Email:    Ameya Dai,     Here are a few financial  resources you may find helpful.   The first one is Mint, it s a free online service you can use for budgeting independently.  The second one is for Family Means, a  will work with you.      Budget Tracker & Planner  Free Online Money Management  Mint (intuit.com)    [employer]    We have discussed therapy in the past, I ve included the list of providers that are also covered under the Gambling Wiley with the state of [place].    Problem gambling State-approved gambling treatment providers / [place] Department of Human Services ([place]    Thank you,

## 2021-12-23 NOTE — TELEPHONE ENCOUNTER
Writer received Email from PT regarding group:    elenita Holcomb I said I would come to group tonight but I'm not feeling up to it. Im ok..just tired and my legs are hurting. I made my doctors appt and will check in with the physical medicine tomorrow. I will be back next week. Alma Frost    Writer replied with acknowledgement.

## 2021-12-28 ENCOUNTER — HOSPITAL ENCOUNTER (OUTPATIENT)
Dept: BEHAVIORAL HEALTH | Facility: CLINIC | Age: 46
End: 2021-12-28
Attending: FAMILY MEDICINE
Payer: COMMERCIAL

## 2021-12-28 PROCEDURE — 90837 PSYTX W PT 60 MINUTES: CPT | Mod: GT,95

## 2021-12-28 NOTE — PROGRESS NOTES
Individual counseling session:    Telemedicine Visit: The patient's condition can be safely assessed and treated via synchronous audio and visual telemedicine encounter.      Reason for Telemedicine Visit: Services only offered telehealth    Originating Site (Patient Location): Patient's home    Distant Site (Provider Location): Provider Remote Setting- Home Office    Consent:  The patient/guardian has verbally consented to: the potential risks and benefits of telemedicine (video visit) versus in person care; bill my insurance or make self-payment for services provided; and responsibility for payment of non-covered services.     Mode of Communication:  Video Conference via [employer]    As the provider I attest to compliance with applicable laws and regulations related to telemedicine.    Counselor verified Patient with 2-point verification: Patient is known to provider.    Video visit start time: 10:00 AM  Video visit end time: 10:54 AM    Length of session: 54 minutes    D: PT met with counselor for scheduled individual counseling session.  PT and counselor discussed recovery and continuing care options, including self care and meditation and reimplementing positive thinking patterns.  PT reports feeling of overwhelmed due to obligations from work, recovery and probation.  PT reported having a scheduled appointment with medical provider to discussed mental health symptoms related to depression, ongoing issues related to pain and medication on 1/4/2022 and an appointment with physical medicine on 1/5/2022 for pain.  PT denied experiencing any suicide and/or self harm ideation, plans or attempts,  PT reported she would schedule appointment for mental health therapy and has a list of potential candidates to call..   I: Counselor facilitated 1:1 session.  A: PT presents with increased stressors and encouraged to continue to seek supports with family, peer group and friends.  P: PT to continue attending group, attend  medical appointments and make appointment for mental health therapy.  PT scheduled to meet with writer for individual counseling session 1/4/2022.

## 2022-01-04 NOTE — TELEPHONE ENCOUNTER
PT was scheduled for individual counseling session on 1/4/2022 with writer.  PT sent writer email she is unable to attend session.  PT and counselor rescheduled session for 1/5/2022.

## 2022-01-05 ENCOUNTER — HOSPITAL ENCOUNTER (OUTPATIENT)
Dept: BEHAVIORAL HEALTH | Facility: CLINIC | Age: 47
End: 2022-01-05
Attending: FAMILY MEDICINE
Payer: COMMERCIAL

## 2022-01-05 PROCEDURE — 90837 PSYTX W PT 60 MINUTES: CPT | Mod: GT,95

## 2022-01-05 NOTE — ADDENDUM NOTE
Encounter addended by: Kristi Camarillo [facility] on: 1/5/2022 1:55 PM   Actions taken: Clinical Note Signed

## 2022-01-05 NOTE — PROGRESS NOTES
Individual counseling session:    Telemedicine Visit: The patient's condition can be safely assessed and treated via synchronous audio and visual telemedicine encounter.      Reason for Telemedicine Visit: Services only offered telehealth    Originating Site (Patient Location): Patient's home    Distant Site (Provider Location): Provider Remote Setting- Home Office    Consent:  The patient/guardian has verbally consented to: the potential risks and benefits of telemedicine (video visit) versus in person care; bill my insurance or make self-payment for services provided; and responsibility for payment of non-covered services.     Mode of Communication:  Video Conference via [employer] - Session began via video, due to connection issues, PT was unable to hear writer.  Writer then called PT over the telephone and continued with counseling session.  PT verbally agreed to telephone visit.    As the provider I attest to compliance with applicable laws and regulations related to telemedicine.    Counselor verified Patient with 2-point verification: Patient is known to provider.    Video visit start time: 8:00 AM  Video visit end time: 8:59 AM    Length of session: 59 minutes    D: Writer met with PT for scheduled individual counseling session.  PT reported feeling positive in her recovery and is exploring negative thinking and behavioral patterns increasing gambling urges.  PT denied any gambling and denied experiencing any suicide and/or self harm ideation, plans or attempts.  PT reported reaching out to her sister and sharing her feelings.  PT reports having a scheduled appointment for therapy on 1/25/2022.  PT reports she continues to work with medical providers regarding physical pain and will follow up with primary care provider regarding past low moods/depression symptoms.  PT reported feeling her mental health is currently stable.  I: Counselor facilitated 1:1 session.  A: PT appears motivated with continued  efforts in her recovery.  P: PT to attend group, continue with individual counseling sessions and follow recommendations of medical and mental health providers.  PT scheduled to meet with writer in one week for counseling session.    Kristi Camarillo, VANDANA, CGC

## 2022-01-06 ENCOUNTER — HOSPITAL ENCOUNTER (OUTPATIENT)
Dept: BEHAVIORAL HEALTH | Facility: CLINIC | Age: 47
End: 2022-01-06
Attending: FAMILY MEDICINE
Payer: COMMERCIAL

## 2022-01-06 PROCEDURE — 90853 GROUP PSYCHOTHERAPY: CPT | Mod: GT,95

## 2022-01-07 NOTE — PROGRESS NOTES
Group Therapy Documentation     Was the patient seen in-person or via Telemedicine (if in-person skip to Group Note): Telemedicine    If Telemedicine: The patient's condition can be safely assessed and treated via synchronous audio and visual telemedicine encounter. ? ?      Reason for Telemedicine Visit: Patient has requested telehealth visit    Originating Site (Patient Location): Patient's home    Distant Site (Provider Location): Provider Remote Setting- Home Office    Consent: ?The patient/guardian has verbally consented to: the potential risks and benefits of telemedicine (video visit) versus in person care; bill my insurance or make self-payment for services provided; and responsibility for payment of non-covered services.       Mode of Communication:??Video Conference via Zoom      As the provider I attest to compliance with applicable laws and regulations related to telemedicine.        Patient attended a video group session on the following days: ?          GROUP NOTE:  DATE OF SERVICE:  January 6, 2022    START TIME:  5:30pm    END TIME:  7:26 PM    Group Length:   116 minutes    FACILITATOR(S):    VANDANA Lord, [facility] [place]      TOPIC: BEH Group Therapy, Problem Gambling Group     Number of patients attending the group: 8    Group Therapy Type:  Problem Gambling Group    Group Attendance:  Client attended group     Summary of Group / Topics Discussed: Group checked in sharing any recent events and their current emotions with processing of shared emotions and recent events.  Writer discussed goal setting using S.M.A.R.T. (specific, measurable, attainable, relevant and time-based) and discussed topics relevant to recovery including relationships, supports, and communication on where they felt they were at the beginning of their recovery, where they are now and improvements using goal setting.  PT's encouraged to review additional areas in recovery including home, family, work, finances, self-care,  leisure and spirituality  Group also discussed emotional triggers, negative thinking and self-sabotaging behaviors.  Writer will send group homework assignments:  *How far have I come?  *Self-Sabotage  *Handling Crisis  Group closed with a reading.     Patient's response to the group topic/interactions:  PT appeared to gain insight into the importance of peer / healthy supports in recovery and exploring recovery related emotions and behaviors.      Client specific details: PT shared with the group her difficult month and opening up to her family and she will also begin therapy.  PT actively engaged in group discussion.

## 2022-01-11 ENCOUNTER — TELEPHONE (OUTPATIENT)
Dept: BEHAVIORAL HEALTH | Facility: CLINIC | Age: 47
End: 2022-01-11
Payer: COMMERCIAL

## 2022-01-13 ENCOUNTER — HOSPITAL ENCOUNTER (OUTPATIENT)
Dept: BEHAVIORAL HEALTH | Facility: CLINIC | Age: 47
End: 2022-01-13
Attending: FAMILY MEDICINE
Payer: COMMERCIAL

## 2022-01-13 PROCEDURE — 90853 GROUP PSYCHOTHERAPY: CPT | Mod: GT,95

## 2022-01-14 NOTE — PROGRESS NOTES
Group Therapy Documentation     Was the patient seen in-person or via Telemedicine (if in-person skip to Group Note): Telemedicine    If Telemedicine: The patient's condition can be safely assessed and treated via synchronous audio and visual telemedicine encounter. ? ?      Reason for Telemedicine Visit: Patient has requested telehealth visit    Originating Site (Patient Location): Patient's home    Distant Site (Provider Location): Provider Remote Setting- Home Office    Consent: ?The patient/guardian has verbally consented to: the potential risks and benefits of telemedicine (video visit) versus in person care; bill my insurance or make self-payment for services provided; and responsibility for payment of non-covered services.       Mode of Communication:??Video Conference via Zoom      As the provider I attest to compliance with applicable laws and regulations related to telemedicine.        Patient attended a video group session on the following days: ?          GROUP NOTE:  DATE OF SERVICE:  January 13, 2022    START TIME:  5:30pm    END TIME:  7:23 PM    Group Length:   113 minutes    FACILITATOR(S):    VANDANA Lord, [facility]    TOPIC: BEH Group Therapy, Problem Gambling Group     Number of patients attending the group: 8    Group Therapy Type:  Problem Gambling Group    Group Attendance:  Client attended group     Summary of Group / Topics Discussed: Group checked in sharing a feeling and any recent events.  Group discussion was held on identifying stressors and emotions with discussion on identifying uncomfortable emotions and using defense mechanisms to cover these emotions, in addition to how they can become triggers to padilla and use other substances, in addition to other unhealthy behaviors.  Group discussed anger as a secondary emotion. Group members shared their feelings and encouraged to explore their defense mechanism, explore therapy, and begin journaling as to gain more awareness of  emotions.     Patient's response to the group topic/interactions:  Patients appeared to gain insight into the triggering effects of uncomfortable feelings and identifying them as potential relapse factors.      Client specific details:   PT shared recent stressors of Covid exposure and having to take time off from work.  PT reported she was able to work through these stressors and has begin to maintain positivity and gratitude and reincorporate prayer.  PT shared she is scheduled for therapy.  PT shared sadness as an emotional trigger.    Kristi Camarillo, VANDANA, CGC

## 2022-01-14 NOTE — ADDENDUM NOTE
Encounter addended by: Kristi Camarillo LADC on: 1/13/2022 9:34 PM   Actions taken: Charge Capture section accepted

## 2022-01-18 ENCOUNTER — HOSPITAL ENCOUNTER (OUTPATIENT)
Dept: BEHAVIORAL HEALTH | Facility: CLINIC | Age: 47
End: 2022-01-18
Attending: FAMILY MEDICINE
Payer: COMMERCIAL

## 2022-01-18 PROCEDURE — 90853 GROUP PSYCHOTHERAPY: CPT | Mod: GT,95

## 2022-01-18 NOTE — PROGRESS NOTES
Patient:  Malaika Anne                         Date of Assessment: 6/3/2021            Problem Gambling Progress Note and Treatment Plan Review     Attendance  Group/Individual: Phase II.  PT attends group and individual sessions via video conference.    Groups Attended: PT attended group on 1/6/2022 & 1/13/2022  PT also attended individual counseling sessions on 12/22/2021, 12/28/2021 and 1/5/2022.2    Support group attended this week: no    Reporting sobriety:  yes    Client Treatment Goals:   1. Have the ability to identify and control triggers and urges to padilla.  2. Develop copings skills  3. Rebuild relationships with family and friends.      Treatment Plan     Treatment Plan Review competed on: January 18, 2022    Staff Members contributing:  Kristi Camarillo, VANDANA, [facility]  & JANELL HintonT, [facility]                         Received Supervision:  yes    Client: contributed to goals and plan.  Client received copy of plan/revised plan: Yes  Client agrees with plan/revised plan: Yes    Changes to Treatment Plan: No  New Goals added since last review:  No additional goals added at this time.    Goals worked on since last review:  Dimension 1 PT denied any gambling behaviors.  Dimension 2 PT reports she continues to work with medical providers regarding issues related to pain.  PT reports her ability to navigate the health care system independently.  Dimension 3 PT reports having a scheduled appointment for mental health therapy at this time and working with medical for medication management.  PT attended group with discussion on emotions and how they can impact recovery.  PT denied any suicide and/or self harm ideation, plans or attempts.  Dimension 4 PT has missed group sessions this past month, however, has met with counselor for individual sessions.  PT continues to present as motivated in her recovery.  PT attended group identifying S.M.A.R.T. goals.  Dimension 5 PT reported experiencing urges in the last  month and denies any gambling behaviors.  PT continues to gain insight into the impact of both her unmanaged mental health and emotional health and strengthen her relapse prevention strategies, including gaining a therapist and connecting to family.  Dimension 6 PT reports she continued to work full time and reports she is working to build on healthy supports with family and others in the community  PT is also working to find daily balance and structure.    Strategies effective: yes    Strategies need these changes: Gain a mental health therapist, follow up with medical providers, increase social supports and continue building on daily balance.    Depression and Anxiety at Intake: Patient reported experiencing signs of depression and will talk to a counselor once she has insurance.    Patient's PHQ-9 score was 23 out of 27, indicating severe depression on 05/25/21 and had dropped to 16/27, no longer reporting suicidal ideation at today's eval.  Patient's ADIEL-7 score was 15, indicating severe anxiety on 05/25/21 and dropped to an 8 and showing mild anxiety during today's eval.  Patient denied suicide attempts in the past.     Intake Dimension Ratings:    Dimension 1  0 .    Dimension 2  0 .    Dimension 3  2 .    Dimension 4  1 .    Dimension 5  2 .    Dimension 6  2 .      Guide to Risk Ratings for Suicidality:   IDEATION: Active thoughts of suicide? INTENT: Intent to follow on suicide? PLAN: Plan to follow through on suicide? Level of Risk:   IF Yes Yes Yes Patient = High Emergent   IF Yes Yes No Patient = High Urgent/Non-Emergent   IF Yes No No Patient = Moderate Non-Urgent   IF No No   No Patient = Low Risk   The patient's ADDITIONAL RISK FACTORS and lack of PROTECTIVE FACTORS may increase their overall suicide risk ratings.     Patient's/client's current risk rating:  Low Risk  PT denied any gambling behaviors.  Safety Plan on File  No risk identified.    Family Involvement:   Phase II    Data:   client did  participate  Continues to gain insight.    Intervention:   Counselor feedback  Education  Emotional management  Group feedback  Relapse prevention      Assessment:   Stages of Change Model  Action    Appears/Sounds:  Cooperative  Motivated  Engaged      Plan:  Focus on recovery environment  Monitor emotional/physical health  Continue to work towards treatment plan goals.

## 2022-01-18 NOTE — ADDENDUM NOTE
Encounter addended by: Kristi Camarillo [facility] on: 1/18/2022 8:21 AM   Actions taken: Clinical Note Signed

## 2022-01-19 NOTE — PROGRESS NOTES
Group Therapy Documentation     Was the patient seen in-person or via Telemedicine (if in-person skip to Group Note): Telemedicine    If Telemedicine: The patient's condition can be safely assessed and treated via synchronous audio and visual telemedicine encounter. ? ?      Reason for Telemedicine Visit: Patient has requested telehealth visit    Originating Site (Patient Location): Patient's home    Distant Site (Provider Location): Provider Remote Setting- Home Office    Consent: ?The patient/guardian has verbally consented to: the potential risks and benefits of telemedicine (video visit) versus in person care; bill my insurance or make self-payment for services provided; and responsibility for payment of non-covered services.       Mode of Communication:??Video Conference via Zoom      As the provider I attest to compliance with applicable laws and regulations related to telemedicine.        Patient attended a video group session on the following days: ?          GROUP NOTE:  DATE OF SERVICE:  January 18, 2022    START TIME:  5:30pm    END TIME:  7:24pm    Group Length:   114 min    FACILITATOR(S):    Tere POPE, CHRYSTAL    TOPIC: BEH Group Therapy, Problem Gambling Group     Number of patients attending the group: 8    Group Therapy Type:  Problem Gambling Group    Group Attendance:  Client attended group     Summary of Group / Topics Discussed:   Group started with a guided meditation: Sanity Day. Group then shared check ins for the past week and added a positive, that had happened to them since last group. We then worked on Chapter 6 - Concept of Randomness/Gamblers Fallacy. Group watched the video The Gambler s Fallacy: The Basic Fallacy. Which led to discussion about Gamblers Fallacy. Then group worked on Chapter 7 - Cycle of the Gambling Action, followed by discussion. Group then worked on worksheet Sober Activities, listing different activities from A-Z. With further information on Geocaching, watching  videos from website on how to start Geocaching. Group ended with a Positive Thinking reading.      Patient's response to the group topic/interactions:    The group was engaged in discussion on Gamblers Fallacy and the Cycle of the Gambling Action. Patients were excited to work on Sober Activities worksheet.       Client specific details:   Patient shared with the group that she has new earlier hours at work and is happy about that. Patient discussed with group how her Gamblers Fallacy was to be higher to win more. Patient shared that she would like to see movies for her sober activity.     Tere Lo ADC-T, CGC

## 2022-01-20 NOTE — ADDENDUM NOTE
Encounter addended by: Tere Lo [facility] on: 1/20/2022 11:27 AM   Actions taken: Clinical Note Signed

## 2022-01-20 NOTE — PROGRESS NOTES
Patient:  Malaika Anne                         Date of Assessment: 06/03/21            Problem Gambling Progress Note and Treatment Plan Review     Attendance  Group/Individual: Phase II, patient attends group and individual sessions via video conference.     Groups Attended: 01/18/22    Support group attended this week: no    Reporting sobriety:  yes    Client Treatment Goals:   1. Have the ability to identify and control triggers and urges to padilla.  2. Develop copings skills  3. Rebuild relationships with family and friends.    Treatment Plan     Treatment Plan Review competed on: January 20, 2022    Staff Members contributing:  Kristi Camarillo, [facility], [facility] & Tere Lo, JUAN RAMON-T, [facility]                         Received Supervision:  yes    Client: contributed to goals and plan.  Client received copy of plan/revised plan: Yes  Client agrees with plan/revised plan: Yes    Changes to Treatment Plan: No  New Goals added since last review:  No additional goals added at this time.    Goals worked on since last review:  Dimension 1 Patient denies any gambling behaviors at this time.   Dimension 2 Patient continues to work with medical providers as needed.   Dimension 3 Patient discussed finding a mental health therapist.   Dimension 4 Patient has missed groups in the past month but is ready to start again with two groups weekly and individual sessions as needed.   Dimension 5 Patient attended group with topics: Concept of Randomness/Gamblers Fallacy and The Cycle of the Gambling Action.  Dimension 6 Patient continues to work full time and had her hours changed to earlier in the day so there will be less conflicts with group.     Strategies effective: yes    Strategies need these changes:   Gain a mental health therapist, follow up with medical providers, increase social supports and continue building on daily balance.    Depression and Anxiety at Intake:   Patient reported experiencing signs of depression and will  talk to a counselor once she has insurance.    Patient's PHQ-9 score was 23 out of 27, indicating severe depression on 05/25/21 and had dropped to 16/27, no longer reporting suicidal ideation at today's eval.  Patient's ADIEL-7 score was 15, indicating severe anxiety on 05/25/21 and dropped to an 8 and showing mild anxiety during today's eval.  Patient denied suicide attempts in the past.     Intake Dimension Ratings:    Dimension 1  0    Dimension 2  0    Dimension 3  2    Dimension 4  1    Dimension 5  2    Dimension 6  2      Guide to Risk Ratings for Suicidality:   IDEATION: Active thoughts of suicide? INTENT: Intent to follow on suicide? PLAN: Plan to follow through on suicide? Level of Risk:   IF Yes Yes Yes Patient = High Emergent   IF Yes Yes No Patient = High Urgent/Non-Emergent   IF Yes No No Patient = Moderate Non-Urgent   IF No No   No Patient = Low Risk   The patient's ADDITIONAL RISK FACTORS and lack of PROTECTIVE FACTORS may increase their overall suicide risk ratings.     Patient's/client's current risk rating:  Low Risk  Patient denies and gambling behaviors at this time.   Safety Plan on File  No risk identified    Family Involvement:   none schedule this week    Data:   client did participate      Intervention:   Counselor feedback  Education      Assessment:   Stages of Change Model  Action    Appears/Sounds:  Cooperative      Plan:  Monitor emotional and physical health        Tere POPE, [facility]

## 2022-01-27 ENCOUNTER — HOSPITAL ENCOUNTER (OUTPATIENT)
Dept: BEHAVIORAL HEALTH | Facility: CLINIC | Age: 47
End: 2022-01-27
Attending: FAMILY MEDICINE
Payer: COMMERCIAL

## 2022-01-27 PROCEDURE — 90853 GROUP PSYCHOTHERAPY: CPT | Mod: GT,95

## 2022-01-28 NOTE — PROGRESS NOTES
Group Therapy Documentation     Was the patient seen in-person or via Telemedicine (if in-person skip to Group Note): Telemedicine    If Telemedicine: The patient's condition can be safely assessed and treated via synchronous audio and visual telemedicine encounter. ? ?      Reason for Telemedicine Visit: Patient has requested telehealth visit    Originating Site (Patient Location): Patients home    Distant Site (Provider Location): Provider Remote Setting- Home Office    Consent: ?The patient/guardian has verbally consented to: the potential risks and benefits of telemedicine (video visit) versus in person care; bill my insurance or make self-payment for services provided; and responsibility for payment of non-covered services.       Mode of Communication:??Video Conference via Zoom      As the provider I attest to compliance with applicable laws and regulations related to telemedicine.        Patient attended a video group session on the following days: ?          GROUP NOTE:  DATE OF SERVICE:  January 27, 2022    START TIME:  5:30pm    END TIME:  7:28 PM    Group Length:   118 minutes    FACILITATOR(S):    VANDANA Lord, [facility]    TOPIC: BEH Group Therapy, Problem Gambling Group     Number of patients attending the group: 8    Group Therapy Type:  Problem Gambling Group    Group Attendance:  Client attended group     Summary of Group / Topics Discussed: Group members checked in sharing feelings and any recent events.  Group discussed the importance of connection and active addiction is isolative.  Group discussed ways to connect to other people, pets, spirituality, nature, knowledge, family, friends.  Discussion on various factors inhibiting connection, such as defense mechanisms.  Group discussed defenses used and encouraged group members to identify the defenses they use.  Group members encouraged to build on connections in recovery.     Patient's response to the group topic/interactions:  PT  appeared to gain insight into the ongoing need for healthy connections in recovery and identify used defenses.      Client specific details: PT shared making decisions about he employment and able to remain in her position. PT shared she connects to her family but would like to make more connections.

## 2022-02-01 ENCOUNTER — TELEPHONE (OUTPATIENT)
Dept: BEHAVIORAL HEALTH | Facility: CLINIC | Age: 47
End: 2022-02-01
Payer: COMMERCIAL

## 2022-02-08 ENCOUNTER — HOSPITAL ENCOUNTER (OUTPATIENT)
Dept: BEHAVIORAL HEALTH | Facility: CLINIC | Age: 47
End: 2022-02-08
Attending: FAMILY MEDICINE
Payer: COMMERCIAL

## 2022-02-08 PROCEDURE — 90853 GROUP PSYCHOTHERAPY: CPT | Mod: GT,95

## 2022-02-09 NOTE — PROGRESS NOTES
Group Therapy Documentation     Was the patient seen in-person or via Telemedicine (if in-person skip to Group Note): Telemedicine    If Telemedicine: The patient's condition can be safely assessed and treated via synchronous audio and visual telemedicine encounter. ? ?      Reason for Telemedicine Visit: Patient has requested telehealth visit    Originating Site (Patient Location): Patient's home    Distant Site (Provider Location): Provider Remote Setting- Home Office    Consent: ?The patient/guardian has verbally consented to: the potential risks and benefits of telemedicine (video visit) versus in person care; bill my insurance or make self-payment for services provided; and responsibility for payment of non-covered services.       Mode of Communication:??Video Conference via Zoom      As the provider I attest to compliance with applicable laws and regulations related to telemedicine.        Patient attended a video group session on the following days: ?          GROUP NOTE:  DATE OF SERVICE:  February 8, 2022    START TIME:  5:30pm    END TIME:  7:08pm    Group Length:   98 min    FACILITATOR(S):    Tere POPE CGC    TOPIC: BEH Group Therapy, Problem Gambling Group     Number of patients attending the group: 7    Group Therapy Type:  Problem Gambling Group    Group Attendance:  Client attended group     Summary of Group / Topics Discussed:  Group started with a Gamblers Anonymous speaker. Patients followed up with questions about GA. Group members took turns with weekly check-ins, adding a positive that happened to them this past week. Group worked through  The Money Exercise , answering questions as to what money means to them and others. Group then went over the worksheet  Build you own Barriers to Money . Followed by discussion on how much money (cash) they should carry at any given time. Group ended with a Positive Thinking reading.      Patient's response to the group topic/interactions:    Patient  "was attentive with [employer] Anonymous speaker. Patient activity participated in check in and worksheets.       Client specific details:    Patient shared with group that she has started her appointments with her physical medicine provider, some tests were done, now waiting for results. Patient also shared that she will start mental health therapy on the 25th of this month. Patient said she is \"excited\" to get started. Patient also shared with the group that she has lost 45 lbs and feels better physically. Patient discussed that she has been budgeting more and would only need $5 a day in cash to carry.        Tere Lo ADC-T, [facility]  "

## 2022-02-10 ENCOUNTER — HOSPITAL ENCOUNTER (OUTPATIENT)
Dept: BEHAVIORAL HEALTH | Facility: CLINIC | Age: 47
End: 2022-02-10
Attending: FAMILY MEDICINE
Payer: COMMERCIAL

## 2022-02-10 PROCEDURE — 90853 GROUP PSYCHOTHERAPY: CPT | Mod: GT,95

## 2022-02-10 NOTE — PROGRESS NOTES
Patient:  Malaika Anne                         Date of Assessment: 06/03/2021            Problem Gambling Progress Note and Treatment Plan Review     Attendance  Group/Individual:Phase II, Patient attended group and individual session via video conference.       Groups Attended: 02/08/2022    Support group attended this week: no    Reporting sobriety:  yes    Client Treatment Goals:   1. Have the ability to identify and control triggers and urges to padilla.  2. Develop copings skills  3. Rebuild relationships with family and friends.      Treatment Plan     Treatment Plan Review competed on: February 10, 2022    Staff Members contributing:  Kristi Camarillo, [facility], [facility] [place] & Tere Lo, JANELLT, [facility] [place]                         Received Supervision:  yes    Client: contributed to goals and plan.  Client received copy of plan/revised plan: Yes  Client agrees with plan/revised plan: Yes    Changes to Treatment Plan: No  New Goals added since last review:  No additional goals added at this time.      Goals worked on since last review:  Dimension 1 Patient reports no gambling at this time.   Dimension 2 Patient reports ability to navigate the healthcare system as needed.   Dimension 3 Patient shared that she has connect with and has appointment with new mental health therapist.   Dimension 4 Patient attends group once this past week and participated in discussion.   Dimension 5 Patient attended group with topics:  The Money Exercise  and  Build your own Barriers to Money .   Dimension 6 Patient attended group with GA speaker.     Strategies effective: yes    Strategies need these changes:   Build on daily structure, attend group consistently, and increase supports in the community.     Depression and Anxiety at Intake:   Patient reported experiencing signs of depression and will talk to a counselor once she has insurance.    Patient's PHQ-9 score was 23 out of 27, indicating severe depression on 05/25/21 and had dropped to  16/27, no longer reporting suicidal ideation at today's eval.  Patient's ADIEL-7 score was 15, indicating severe anxiety on 05/25/21 and dropped to an 8 and showing mild anxiety during today's eval.  Patient denied suicide attempts in the past.     Intake Dimension Ratings:    Dimension 1  0    Dimension 2  0    Dimension 3  2    Dimension 4  1    Dimension 5  2    Dimension 6  2      Guide to Risk Ratings for Suicidality:   IDEATION: Active thoughts of suicide? INTENT: Intent to follow on suicide? PLAN: Plan to follow through on suicide? Level of Risk:   IF Yes Yes Yes Patient = High Emergent   IF Yes Yes No Patient = High Urgent/Non-Emergent   IF Yes No No Patient = Moderate Non-Urgent   IF No No   No Patient = Low Risk   The patient's ADDITIONAL RISK FACTORS and lack of PROTECTIVE FACTORS may increase their overall suicide risk ratings.     Patient's/client's current risk rating:  Low Risk  No reports of gambling.   Safety Plan on File  No risk identified    Family Involvement:   none schedule this week    Data:   offered feedback      Intervention:   Education      Assessment:   Stages of Change Model  Action    Appears/Sounds:  Cooperative  Motivated      Plan:  Focus on recovery environment  Monitor emotional/physical health      Tere POPE, CGC

## 2022-02-10 NOTE — ADDENDUM NOTE
Encounter addended by: Tere Lo [facility] on: 2/10/2022 10:05 AM   Actions taken: Clinical Note Signed

## 2022-02-11 NOTE — PROGRESS NOTES
Group Therapy Documentation     Was the patient seen in-person or via Telemedicine (if in-person skip to Group Note): Telemedicine    If Telemedicine: The patient's condition can be safely assessed and treated via synchronous audio and visual telemedicine encounter. ? ?      Reason for Telemedicine Visit: Patient has requested telehealth visit    Originating Site (Patient Location): Patient's home    Distant Site (Provider Location): Provider Remote Setting- Home Office    Consent: ?The patient/guardian has verbally consented to: the potential risks and benefits of telemedicine (video visit) versus in person care; bill my insurance or make self-payment for services provided; and responsibility for payment of non-covered services.       Mode of Communication:??Video Conference via Zoom      As the provider I attest to compliance with applicable laws and regulations related to telemedicine.        Patient attended a video group session on the following days: ?          GROUP NOTE:  DATE OF SERVICE:  February 10, 2022    START TIME:  5:30pm    END TIME:  7:25 PM    Group Length:   115 minutes    FACILITATOR(S):    VANDANA Lord, [facility]    TOPIC: BEH Group Therapy, Problem Gambling Group     Number of patients attending the group: 4    Group Therapy Type:  Problem Gambling Group    Group Attendance:  Client attended group     Summary of Group / Topics Discussed: Sulma Parekh from [employer] presented financial education and resources to group members.  Group members asked questions of presenter.  Discussion was held following presentation regarding additional financial options, openly discussing money with partners or family members, giving up access to money and upcoming tax filing season.  PT's were able to share their thoughts and experiences and sharing their current relationship with money.     Patient's response to the group topic/interactions:  PT appeared to gain insight into financial  education and openly share triggers, progress and thoughts on money.      Client specific details:   PT asked questions of presenter and actively participated in group discussion following.  PT shared she worked through emotions of financial changes and she has been able to look at a budget and make necessary changes.

## 2022-02-22 ENCOUNTER — TELEPHONE (OUTPATIENT)
Dept: BEHAVIORAL HEALTH | Facility: CLINIC | Age: 47
End: 2022-02-22
Payer: COMMERCIAL

## 2022-03-01 ENCOUNTER — HOSPITAL ENCOUNTER (OUTPATIENT)
Dept: BEHAVIORAL HEALTH | Facility: CLINIC | Age: 47
End: 2022-03-01
Attending: FAMILY MEDICINE
Payer: COMMERCIAL

## 2022-03-01 PROCEDURE — 90853 GROUP PSYCHOTHERAPY: CPT | Mod: GT,95

## 2022-03-02 NOTE — PROGRESS NOTES
Group Therapy Documentation     Was the patient seen in-person or via Telemedicine (if in-person skip to Group Note): Telemedicine    If Telemedicine: The patient's condition can be safely assessed and treated via synchronous audio and visual telemedicine encounter. ? ?      Reason for Telemedicine Visit: Patient has requested telehealth visit    Originating Site (Patient Location): Patient's home    Distant Site (Provider Location): Provider Remote Setting- Home Office    Consent: ?The patient/guardian has verbally consented to: the potential risks and benefits of telemedicine (video visit) versus in person care; bill my insurance or make self-payment for services provided; and responsibility for payment of non-covered services.       Mode of Communication:??Video Conference via Zoom      As the provider I attest to compliance with applicable laws and regulations related to telemedicine.        Patient attended a video group session on the following days: ?          GROUP NOTE:  DATE OF SERVICE:  March 1, 2022    START TIME:  5:30pm    END TIME:  7:00pm    Group Length:   90 minutes    FACILITATOR(S):    Tere POPE CGC    TOPIC: BEH Group Therapy, Problem Gambling Group     Number of patients attending the group: 7    Group Therapy Type:  Problem Gambling Group    Group Attendance:  Client attended group     Summary of Group / Topics Discussed:   Group started with writer introducing herself to new patient. Then the group took turns sharing for check in. The group then discussed the possibility of receiving a new stimulus check from the State of [place]. Sharing what had happened in the past with stimulus checks, sharing their feelings about possible new check and safeguards for money. The topic of cross addictions was discussed, how switching from one addiction to another is common and how to identify an addiction. Group participated in discussion on sober activities that can be done at the local library. Group  "ended with a positive thinking reading.      Patient's response to the group topic/interactions:   Patient appeared to gain insight in ways to safeguard unexpected money. Also, in identifying possible cross addictions.       Client specific details:  Patient shared that she has not been to group because two weeks ago, she just did not feel like attending. But last week was because of pain, but feeling better now. Patient shared that she has started sessions with her new therapist, the first appointment was last Friday. Patient requested that I resend her Email with [employer] offer for one year free. Patient shared with group that she would \"live\" at the library when she was a kid and she was thankful for the information shared and will start reading for fun again.       Tere Lo ADC-T, C[facility]  "

## 2022-03-03 ENCOUNTER — HOSPITAL ENCOUNTER (OUTPATIENT)
Dept: BEHAVIORAL HEALTH | Facility: CLINIC | Age: 47
End: 2022-03-03
Attending: FAMILY MEDICINE
Payer: COMMERCIAL

## 2022-03-03 PROCEDURE — 90853 GROUP PSYCHOTHERAPY: CPT | Mod: GT,95

## 2022-03-03 NOTE — ADDENDUM NOTE
Encounter addended by: Tere Lo [facility] on: 3/3/2022 8:41 AM   Actions taken: Clinical Note Signed

## 2022-03-03 NOTE — PROGRESS NOTES
Patient:  Malaika Anne                         Date of Assessment: 06/03/2021            Problem Gambling Progress Note and Treatment Plan Review     Attendance  Group/Individual: Phase II, patient attends group and individual sessions via video conference.     Groups Attended: 03/01/22    Support group attended this week: no    Reporting sobriety:  yes    Client Treatment Goals:   1. Have the ability to identify and control triggers and urges to padilla.  2. Develop copings skills  3. Rebuild relationships with family and friends.    Treatment Plan     Treatment Plan Review competed on: March 3, 2022    Staff Members contributing:  Kristi Camarillo, [facility], [facility] & Tere Lo, JANELLT, [facility]                         Received Supervision:  yes    Client: contributed to goals and plan.  Client received copy of plan/revised plan: Yes  Client agrees with plan/revised plan: Yes    Changes to Treatment Plan: No  New Goals added since last review:  No additional goals added at this time.      Goals worked on since last review:  Dimension 1 Patient reports no gambling at this time.  Dimension 2 Patient reports able to access healthcare as needed.   Dimension 3 Patient reports starting session with mental health therapist.   Dimension 4 Patient verbalizes motivation for recovery, but only attended one group this week.   Dimension 5 Patient attended group with topics, possible budget surplus check from the [facility] and Cross Addictions.  Dimension 6 Patient reports continued employment at this time.     Strategies effective: yes    Strategies need these changes:    Attend group consistently, keep and attend scheduled sessions with mental health therapist and continue to work towards treatment plan goals.    Depression and Anxiety at Intake:   Patient reported experiencing signs of depression and will talk to a counselor once she has insurance.    Patient's PHQ-9 score was 23 out of 27, indicating severe depression on  05/25/21 and had dropped to 16/27, no longer reporting suicidal ideation at today's eval.  Patient's ADIEL-7 score was 15, indicating severe anxiety on 05/25/21 and dropped to an 8 and showing mild anxiety during today's eval.  Patient denied suicide attempts in the past.     Intake Dimension Ratings:    Dimension 1  0    Dimension 2  0    Dimension 3  2    Dimension 4  1    Dimension 5  2    Dimension 6  2      Guide to Risk Ratings for Suicidality:   IDEATION: Active thoughts of suicide? INTENT: Intent to follow on suicide? PLAN: Plan to follow through on suicide? Level of Risk:   IF Yes Yes Yes Patient = High Emergent   IF Yes Yes No Patient = High Urgent/Non-Emergent   IF Yes No No Patient = Moderate Non-Urgent   IF No No   No Patient = Low Risk   The patient's ADDITIONAL RISK FACTORS and lack of PROTECTIVE FACTORS may increase their overall suicide risk ratings.     Patient's/client's current risk rating:  Low Risk  No reports of gambling at this time.   Safety Plan on File  No risk identified    Family Involvement:   none schedule this week    Data:   Patient did attend one group this week      Intervention:   Counselor feedback  Education  Group feedback      Assessment:   Stages of Change Model  Action    Cooperative  Engaged      Plan:  Focus on recovery environment  Monitor emotional/physical health  Other Increase attendance to group sessions        Tere MACIELT, CGC

## 2022-03-04 ENCOUNTER — HOSPITAL ENCOUNTER (OUTPATIENT)
Dept: BEHAVIORAL HEALTH | Facility: CLINIC | Age: 47
End: 2022-03-04
Attending: FAMILY MEDICINE
Payer: COMMERCIAL

## 2022-03-04 PROCEDURE — 90853 GROUP PSYCHOTHERAPY: CPT | Mod: GT,95

## 2022-03-04 NOTE — PROGRESS NOTES
Group Therapy Documentation     Was the patient seen in-person or via Telemedicine (if in-person skip to Group Note): Telemedicine    If Telemedicine: The patient's condition can be safely assessed and treated via synchronous audio and visual telemedicine encounter. ? ?      Reason for Telemedicine Visit: Patient has requested telehealth visit    Originating Site (Patient Location): Patient's home     Distant Site (Provider Location): [facility] Room Location.    Consent: ?The patient/guardian has verbally consented to: the potential risks and benefits of telemedicine (video visit) versus in person care; bill my insurance or make self-payment for services provided; and responsibility for payment of non-covered services.       Mode of Communication:??Video Conference via Zoom      As the provider I attest to compliance with applicable laws and regulations related to telemedicine.        Patient attended a video group session on the following days: ?          GROUP NOTE:  DATE OF SERVICE:  March 3, 2022    START TIME:  5:30pm    END TIME:  7:22 PM    Group Length:   112 minutes    FACILITATOR(S):    VANDANA Lord, [facility] [place]    TOPIC: BEH Group Therapy, Problem Gambling Group     Number of patients attending the group: 7    Group Therapy Type:  Problem Gambling Group    Group Attendance:  Client attended group     Summary of Group / Topics Discussed: Group discussion opened with new group member introduction and group members introducing and welcoming new member.  Group members also checked in with recent events.  Group discussion was held on how gambling addiction has an impact on self-esteem and self-worth, including feeling judged.  Group encouraged to continue to think about their recovery and how to implement supports, self-care and connection into their recovery as group discussed how isolating recovery can be as gambling addiction if often misunderstood.   Group closed with each group member sharing various ways they will offer kindness to themselves.     Patient's response to the group topic/interactions:  PT appeared to gain insight into the impact addiction has on self-esteem.      Client specific details: PT shared she had a difficult week learning family events from her childhood. PT identified having supports from her sister, brother and mother.  PT shared she disclosed to her coworkers her recovery from gambling and also attended her first mental health therapy appointment.  PT shared per sciatic pain has decreased.  PT actively contributed to group discussion.

## 2022-03-04 NOTE — PROGRESS NOTES
Individual counseling session:    Telemedicine Visit: The patient's condition can be safely assessed and treated via synchronous audio and visual telemedicine encounter.      Reason for Telemedicine Visit: Services only offered telehealth    Originating Site (Patient Location): Patient's home    Distant Site (Provider Location): Provider Remote Setting- Home Office    Consent:  The patient/guardian has verbally consented to: the potential risks and benefits of telemedicine (video visit) versus in person care; bill my insurance or make self-payment for services provided; and responsibility for payment of non-covered services.     Mode of Communication:  Video Conference via [facility]    As the provider I attest to compliance with applicable laws and regulations related to telemedicine.    Counselor verified Patient with 2-point verification: Patient is known to provider.    Video visit start time: 10:30 AM  Video visit end time: 11:26 AM    Length of session: 56 minutes    D: Writer met with PT for scheduled individual counseling session.  Writer and PT discussed progress in her recovery and reports she has begun seeing an individual therapist.  PT shared feeling positive in her recovery and able to identify when experiencing urges.  PT reported utilizing [employer] services as an additional blockade to prevent relapse and reports she has begun to share her recovery process with others and identified the need to build on supports in the community.  PT shared she is working with medication and medical providers and reports her physical pain has decreased and she is working to implement utilizing exercise into her routine.  PT shared she has begun to eat healthy and minimizing sugary food contributing to some weight loss.  PT will transition to Phase III of programming, one day per week and follow up with individual sessions with counselor as needed.  I: Counselor facilitated 1:1 session.  A: PT appears motivated in her  recovery and appears engaged as she continues to implement safeguards to prevent relapse as well as continued engagement in her recovery.  P: PT to attend OP group therapy one group per week, follow up with mental health therapist, meet with counseling staff as needed and continue to work on treatment plan goals.

## 2022-03-10 ENCOUNTER — HOSPITAL ENCOUNTER (OUTPATIENT)
Dept: BEHAVIORAL HEALTH | Facility: CLINIC | Age: 47
Discharge: HOME | End: 2022-03-10
Attending: FAMILY MEDICINE
Payer: COMMERCIAL

## 2022-03-10 PROCEDURE — 90853 GROUP PSYCHOTHERAPY: CPT | Mod: GT,95

## 2022-03-11 NOTE — PROGRESS NOTES
"Group Therapy Documentation     Was the patient seen in-person or via Telemedicine (if in-person skip to Group Note): Telemedicine    If Telemedicine: The patient's condition can be safely assessed and treated via synchronous audio and visual telemedicine encounter. ? ?      Reason for Telemedicine Visit: Patient has requested telehealth visit    Originating Site (Patient Location): Patient's home    Distant Site (Provider Location): Provider Remote Setting- Home Office    Consent: ?The patient/guardian has verbally consented to: the potential risks and benefits of telemedicine (video visit) versus in person care; bill my insurance or make self-payment for services provided; and responsibility for payment of non-covered services.       Mode of Communication:??Video Conference via Zoom      As the provider I attest to compliance with applicable laws and regulations related to telemedicine.        Patient attended a video group session on the following days: ?          GROUP NOTE:  DATE OF SERVICE:  March 10, 2022    START TIME:  5:30pm    END TIME:  7:12 PM    Group Length:   102 minutes    FACILITATOR(S):    VANDANA Lord, [facility]    TOPIC: BEH Group Therapy, Problem Gambling Group     Number of patients attending the group: 4    Group Therapy Type:  Problem Gambling Group    Group Attendance:  Client attended group     Summary of Group / Topics Discussed: PT checked in sharing an recent events and a feeling.  Group discussion was held on the assignment \"Taking Daily Inventory\" and discussed honesty with self and other, trust, hope and happiness. Group members encouraged to review assignment in their group folder they have received. Group also discussed the meaning of authenticity [value] to self and lying by omission.  Group ended with a reading.     Patient's response to the group topic/interactions:  PT appeared to gain insight into their personal inventory on trust and honesty with both themselves " and others, in including relationships and varying levels of trust.      Client specific details: PT actively shared in group discussion sharing the changes she has noticed within her self with honesty and trust, including thinking about lying by omission.

## 2022-03-14 NOTE — ADDENDUM NOTE
Encounter addended by: Kristi Camarillo [facility] on: 3/14/2022 12:29 PM   Actions taken: Clinical Note Signed

## 2022-03-14 NOTE — PROGRESS NOTES
Patient:  Malaika Anne                         Date of Assessment: 6/3/2021            Problem Gambling Progress Note and Treatment Plan Review     Attendance  Group/Individual: Phase III.  PT has transitioned to Phase III of programming and has begun attending one group per week via video conference.    Groups Attended: 3/3/2022, 3/10/2022.  PT also attended individual counseling session on 3/4/2022.    Support group attended this week: no    Reporting sobriety:  No reports of gambling.    Client Treatment Goals:   1. Have the ability to identify and control triggers and urges to padilla.  2. Develop copings skills  3. Rebuild relationships with family and friends.      Treatment Plan     Treatment Plan Review competed on: March 14, 2022    Staff Members contributing:  Kristi Camarlilo, VANDANA, [facility] & JANELL HintonT, [facility]                         Received Supervision:  yes    Client: contributed to goals and plan.  Client received copy of plan/revised plan: Yes  Client agrees with plan/revised plan: Yes    Changes to Treatment Plan: No  New Goals added since last review:  No additional goals added at this time.    Goals worked on since last review:  Dimension 1 No reports of gambling at this time.  Dimension 2 PT reports she continues to work with medical providers regarding pain management.  PT does report a decrease of pain and reports she has began to implement exercise.  PT reports her ability to navigate the health care system independently.    Dimension 3 PT reports she has begun to meet with a mental health therapist, reporting she will schedule additional appointments.  PT attended group discussion on identifying the impacts of addiction on self esteem and self worth in addition to lying by omission.   Dimension 4 PT has transitioned to Phase III of programming, one group per week.  PT appears to be an  active participant in group and treatment processes.    Dimension 5 PT continues to strengthen her  recovery and build on relapse prevention strategies at this time.  PT attended group on implementing a daily inventory.  Dimension 6 PT reports continued efforts to strengthen her daily routine.  PT does report continued employment at this time and efforts to build on supports in the community.    Strategies effective: yes    Strategies need these changes: Build on supports in the community and continue to meet with mental health therapist.    Depression and Anxiety at Intake:    Patient reported experiencing signs of depression and will talk to a counselor once she has insurance.    Patient's PHQ-9 score was 23 out of 27, indicating severe depression on 05/25/21 and had dropped to 16/27, no longer reporting suicidal ideation at today's eval.  Patient's ADIEL-7 score was 15, indicating severe anxiety on 05/25/21 and dropped to an 8 and showing mild anxiety during today's eval.  Patient denied suicide attempts in the past.      Intake Dimension Ratings:    Dimension 1: 0     Dimension 2: 0     Dimension 3: 2     Dimension 4: 1     Dimension 5: 2     Dimension 6: 3          Guide to Risk Ratings for Suicidality:   IDEATION: Active thoughts of suicide? INTENT: Intent to follow on suicide? PLAN: Plan to follow through on suicide? Level of Risk:   IF Yes Yes Yes Patient = High Emergent   IF Yes Yes No Patient = High Urgent/Non-Emergent   IF Yes No No Patient = Moderate Non-Urgent   IF No No   No Patient = Low Risk   The patient's ADDITIONAL RISK FACTORS and lack of PROTECTIVE FACTORS may increase their overall suicide risk ratings.     Patient's/client's current risk rating:  Low Risk  No reports of gambling.  Safety Plan on File  No risk identified.    Family Involvement:   Phase III    Data:   offered feedback client did actively participate Continues to gain insight.    Intervention:   Counselor feedback  Education  Relapse prevention    Assessment:   Stages of Change  Model  Action    Appears/Sounds:  Cooperative  Motivated  Engaged      Plan:  Focus on recovery environment  Monitor emotional/physical health  Keep and attend scheduled sessions with mental health therapist.  Continue to work towards treatment plan goals.

## 2022-03-24 ENCOUNTER — HOSPITAL ENCOUNTER (OUTPATIENT)
Dept: BEHAVIORAL HEALTH | Facility: CLINIC | Age: 47
Discharge: HOME | End: 2022-03-24
Attending: FAMILY MEDICINE
Payer: COMMERCIAL

## 2022-03-24 PROCEDURE — 90853 GROUP PSYCHOTHERAPY: CPT | Mod: GT,95

## 2022-03-25 NOTE — PROGRESS NOTES
"Group Therapy Documentation     Was the patient seen in-person or via Telemedicine (if in-person skip to Group Note): Telemedicine    If Telemedicine: The patient's condition can be safely assessed and treated via synchronous audio and visual telemedicine encounter. ? ?      Reason for Telemedicine Visit: Patient has requested telehealth visit    Originating Site (Patient Location): PT s home    Distant Site (Provider Location): Provider Remote Setting- Home Office    Consent: ?The patient/guardian has verbally consented to: the potential risks and benefits of telemedicine (video visit) versus in person care; bill my insurance or make self-payment for services provided; and responsibility for payment of non-covered services.       Mode of Communication:??Video Conference via Zoom      As the provider I attest to compliance with applicable laws and regulations related to telemedicine.        Patient attended a video group session on the following days: ?          GROUP NOTE:  DATE OF SERVICE:  March 24, 2022    START TIME:  5:30pm    END TIME:  7:28 PM    Group Length:   158 minutes    FACILITATOR(S):    VANDANA Lord, [facility]    TOPIC: BEH Group Therapy, Problem Gambling Group     Number of patients attending group:  6    Group Therapy Type:  Problem Gambling Group    Group Attendance:  Client attended group     Summary of Group / Topics Discussed: Group began with a short body scan meditation and followed up with group sharing mindfulness activities they engaged in over the week. Group participated in completion of homework assignment \"Relapse Prevention Planning\".  PT's have assignment in their folder as well as writer sent assignment via Email.  Group ended with a reading.     Patient's response to the group topic/interactions:  PT's actively participated in completion of their relapse prevention plan and sharing with the group individual answers.      Client specific details:   PT shared working " extra hours for as a coworker is out, reporting some stressors. PT reported giving herself time in the morning to prepare for her day including additional hour of sleep, prayer and not rushing.

## 2022-03-30 NOTE — ADDENDUM NOTE
Encounter addended by: Kristi Camarillo [facility] on: 3/30/2022 2:51 PM   Actions taken: Pend clinical note, Clinical Note Signed

## 2022-03-30 NOTE — PROGRESS NOTES
Patient:  Malaika Anne                         Date of Assessment: 6/3/2021            Problem Gambling Progress Note and Treatment Plan Review     Attendance  Group/Individual: Phase III.  PT attends group via video conference.    Groups Attended: 3/24/2022    Support group attended this week: no    Reporting sobriety:  yes    Client Treatment Goals:   1. Have the ability to identify and control triggers and urges to padilla.  2. Develop copings skills  3. Rebuild relationships with family and friends.      Treatment Plan     Treatment Plan Review competed on: March 30, 2022    Staff Members contributing:  Kristi Cam[facility]illo, [facility] [place] & Tere Lo, JUAN RAMON-T, [facility] [place]                         Received Supervision:  yes    Client: contributed to goals and plan.  Client received copy of plan/revised plan: Yes  Client agrees with plan/revised plan: Yes    Changes to Treatment Plan: No  New Goals added since last review:  No additional goals added at this time.    Goals worked on since last review:  Dimension 1 No reports of gambling at this time.  Dimension 2 PT reports having established medical care providers in the community, reporting her ability to navigate the health care system independently.  Dimension 3 PT reported having scheduled appointments with mental health therapist in the community.  PT reports building on stress reducing strategies and implementing self care and spirituality into her daily routine.   Dimension 4 PT attended group and is an active participant in group and treatment processes.  Dimension 5 PT attended group and created a relapse prevention plan.  PT reported navigating daily stressors including working additional hours for work temporarily.  Dimension 6 PT reports she continues to adjust to her work schedule and find daily balance.  PT reports continued efforts with connecting to family.    Strategies effective: yes    Strategies need these changes: Continue daily structure, build on peer  recovery and follow recommendations of mental health providers.    Depression and Anxiety at Intake:    Patient reported experiencing signs of depression and will talk to a counselor once she has insurance.    Patient's PHQ-9 score was 23 out of 27, indicating severe depression on 05/25/21 and had dropped to 16/27, no longer reporting suicidal ideation at today's eval.  Patient's ADIEL-7 score was 15, indicating severe anxiety on 05/25/21 and dropped to an 8 and showing mild anxiety during today's eval.  Patient denied suicide attempts in the past.      Intake Dimension Ratings:    Dimension 1: 0     Dimension 2: 0     Dimension 3: 2     Dimension 4: 1     Dimension 5: 2     Dimension 6: 3     Guide to Risk Ratings for Suicidality:   IDEATION: Active thoughts of suicide? INTENT: Intent to follow on suicide? PLAN: Plan to follow through on suicide? Level of Risk:   IF Yes Yes Yes Patient = High Emergent   IF Yes Yes No Patient = High Urgent/Non-Emergent   IF Yes No No Patient = Moderate Non-Urgent   IF No No   No Patient = Low Risk   The patient's ADDITIONAL RISK FACTORS and lack of PROTECTIVE FACTORS may increase their overall suicide risk ratings.     Patient's/client's current risk rating:  Low Risk  No reports of gambling.  Safety Plan on File  No risk identified.    Family Involvement:   Phase III - None scheduled this week.    Data:   good insight client did actively participate      Intervention:   Counselor feedback  Group feedback  Counselor feedback    Assessment:   Stages of Change Model  Maintenance    Appears/Sounds:  Cooperative  Motivated  Engaged      Plan:  Focus on recovery environment  Monitor emotional/physical health  Continue to work towards treatment plan goals.

## 2022-04-14 ENCOUNTER — HOSPITAL ENCOUNTER (OUTPATIENT)
Dept: BEHAVIORAL HEALTH | Facility: CLINIC | Age: 47
Discharge: HOME | End: 2022-04-14
Attending: FAMILY MEDICINE
Payer: COMMERCIAL

## 2022-04-14 PROCEDURE — 90853 GROUP PSYCHOTHERAPY: CPT | Mod: GT,95

## 2022-04-15 NOTE — ADDENDUM NOTE
Encounter addended by: Kristi Camarillo [facility] on: 4/15/2022 7:54 AM   Actions taken: Clinical Note Signed

## 2022-04-15 NOTE — PROGRESS NOTES
"Group Therapy Documentation     Was the patient seen in-person or via Telemedicine (if in-person skip to Group Note): Telemedicine    If Telemedicine: The patient's condition can be safely assessed and treated via synchronous audio and visual telemedicine encounter. ? ?      Reason for Telemedicine Visit: Patient has requested telehealth visit    Originating Site (Patient Location): Patient's home    Distant Site (Provider Location): Provider Remote Setting- Home Office    Consent: ?The patient/guardian has verbally consented to: the potential risks and benefits of telemedicine (video visit) versus in person care; bill my insurance or make self-payment for services provided; and responsibility for payment of non-covered services.       Mode of Communication:??Video Conference via Zoom      As the provider I attest to compliance with applicable laws and regulations related to telemedicine.        Patient attended a video group session on the following days: ?          GROUP NOTE:  DATE OF SERVICE:  April 14, 2022    START TIME:  5:30pm    END TIME:  7:14 PM    Group Length:   104 minutes    FACILITATOR(S): VANDANA Lord, [facility]    TOPIC: BEH Group Therapy, Problem Gambling Group     Number of patients attending the group: 4    Group Therapy Type:  Problem Gambling Group    Group Attendance:  Client attended group     Summary of Group / Topics Discussed: PT's checked in sharing feelings and any updated events since their last attended group session.  Group discussed feelings of guilt and shame.  Areas discussed were where shame originated, effects of shame as well as secrecy, judgements and silence fuels shame.  Group discussed vulnerability and connection as the antidote to shame as well as feelings of worthiness.  Group was video \"Everything you know about addiction is wrong\" by Dariel Gonzalez.  Group discussion followed video.  Group ended with daily virtue reading.     Patient's response to the group " topic/interactions:  PT appeared to gain insight into the effects of shame and insight into their own vulnerability and connection with others.      Client specific details: PT shared increased work demands, reporting she continues to practice self care.  PT shared her experiences with feelings of shame and actively participated in group discussion.

## 2022-04-15 NOTE — PROGRESS NOTES
Patient:  Malaika Anne                         Date of Assessment: 6/3/2021            Problem Gambling Progress Note and Treatment Plan Review     Attendance  Group/Individual:Phase III.  PT attends group via video conference.     Groups Attended: 4/14/2022    Support group attended this week: no    Reporting sobriety:  yes    Client Treatment Goals:   1. Have the ability to identify and control triggers and urges to padilla.  2. Develop copings skills  3. Rebuild relationships with family and friends.      Treatment Plan     Treatment Plan Review competed on: April 15, 2022    Staff Members contributing:  Kristi Camarillo, [facility], [facility] & Tere Lo, JUAN RAMON-T, [facility]                         Received Supervision:  yes    Client: contributed to goals and plan.  Client received copy of plan/revised plan: Yes  Client agrees with plan/revised plan: Yes    Changes to Treatment Plan: No  New Goals added since last review:  No additional goals added at this time.    Goals worked on since last review:  Dimension 1 No reports of gambling at this time.  Dimension 2 PT reports her ability to navigate the health care system independently.  Dimension 3 PT attended group discussion on guilt and shame as well as the importance of connection in recovery.  Dimension 4 PT attended group and is an active participant in group and treatment processes.   Dimension 5 PT continues to strengthen her recovery and practice self-care as part of her routine.  Dimension 6 PT reports increased employment responsibilities at this time.  PT reports continued engagement with family members.    Strategies effective: yes    Strategies need these changes: Build on self care, daily structure and continue to increase supports in the community.    Depression and Anxiety at Intake:    Patient reported experiencing signs of depression and will talk to a counselor once she has insurance.    Patient's PHQ-9 score was 23 out of 27, indicating severe depression on  05/25/21 and had dropped to 16/27, no longer reporting suicidal ideation at today's eval.  Patient's ADIEL-7 score was 15, indicating severe anxiety on 05/25/21 and dropped to an 8 and showing mild anxiety during today's eval.  Patient denied suicide attempts in the past.      Intake Dimension Ratings:    Dimension 1: 0     Dimension 2: 0     Dimension 3: 2     Dimension 4: 1     Dimension 5: 2     Dimension 6: 3     Guide to Risk Ratings for Suicidality:   IDEATION: Active thoughts of suicide? INTENT: Intent to follow on suicide? PLAN: Plan to follow through on suicide? Level of Risk:   IF Yes Yes Yes Patient = High Emergent   IF Yes Yes No Patient = High Urgent/Non-Emergent   IF Yes No No Patient = Moderate Non-Urgent   IF No No   No Patient = Low Risk   The patient's ADDITIONAL RISK FACTORS and lack of PROTECTIVE FACTORS may increase their overall suicide risk ratings.     Patient's/client's current risk rating:  Low Risk  No reports of gambling.  Safety Plan on File  No issues identified.    Family Involvement:   Phase III    Data:   offered feedback good insight client did actively participate    Intervention:   Counselor feedback  Education  Relapse prevention    Assessment:   Stages of Change Model  Maintenance    Appears/Sounds:  Cooperative  Motivated  Engaged    Plan:  Focus on recovery environment  Monitor emotional/physical health  Continue to work towards treatment plan goals.

## 2022-05-05 ENCOUNTER — TELEPHONE (OUTPATIENT)
Dept: BEHAVIORAL HEALTH | Facility: CLINIC | Age: 47
End: 2022-05-05

## 2022-05-06 NOTE — TELEPHONE ENCOUNTER
----- Message from VANDANA Lord sent at 5/5/2022  8:14 PM CDT -----  Regarding: Please add One 1:1 Video Group session  Please add One 1:1 Video Group session    Location of programming:  Video 1:1 Sessions   Date:   5/11/2022 @ 10:00 AM   Individual sessions  Provider: Kristi Camarillo  Number of visits to be scheduled: 1  Length/Duration of Appointment in minutes: 60   Visit Type (VIDEO/TELEPHONE/IN-PERSON):   Individual Therapy Video Visit     Thank you!    Kristi Camarillo

## 2022-05-06 NOTE — TELEPHONE ENCOUNTER
PT contacted counselor via Email reporting she would not be in group on 5/5/2022.  PT requested individual session for Wed, 5/11/2022.  Writer and counselor scheduled individual counseling session for 5/11/2022 @ 10:00 AM.

## 2022-05-11 ENCOUNTER — HOSPITAL ENCOUNTER (OUTPATIENT)
Dept: BEHAVIORAL HEALTH | Facility: CLINIC | Age: 47
Discharge: HOME | End: 2022-05-11
Attending: FAMILY MEDICINE
Payer: COMMERCIAL

## 2022-05-11 PROCEDURE — 90837 PSYTX W PT 60 MINUTES: CPT | Mod: GT,95

## 2022-05-11 NOTE — PROGRESS NOTES
Individual counseling session:    Telemedicine Visit: The patient's condition can be safely assessed and treated via synchronous audio telemedicine encounter.      Reason for Telemedicine Visit: Services only offered telehealth    Originating Site (Patient Location): Patient's home    Distant Site (Provider Location): Provider Remote Setting- Home Office    Consent:  The patient/guardian has verbally consented to: the potential risks and benefits of telemedicine (video visit) versus in person care; bill my insurance or make self-payment for services provided; and responsibility for payment of non-covered services.     Mode of Communication:  Telephone    As the provider I attest to compliance with applicable laws and regulations related to telemedicine.    Counselor verified Patient with 2-point verification: Patient is known to provider.    Video visit start time: 10:00 AM  Video visit end time: 10:58 AM    Length of session: 58 minutes    D: Writer met with PT for scheduled individual counseling session via telephone.  Prior to session, PT contacted writer via Email informing writer of video connection issues and would be able to connect via telephone.  PT reported increased stressors regarding increased hours at work reporting an incident of feeling frustrated and overwhelmed while at work the past couple of weeks.  PT reported she was able to process this with coworkers following and continues to reflect her feelings/response and reports efforts to implement more into her daily life outside of employment.  PT reported her awareness of communicating with others, including coworkers when needed.  PT encouraged to think about healthy work boundaries and explore balance with work and healthy leisure.  PT reports her mood is positive and motivated at this time. PT denied experiencing any urges to padilla recently and reported if they are presented, she has been easily able to reflect on the past and work through them.   PT reports she has been inconsistent with therapy and medical appointments in the past few weeks due to work and reports she is working to make self-care a priority.  PT reports she will contact/schedule both medical and therapy appointments.  PT reported she begun individual therapy and cancelled sessions due to work obligations. PT reported she is wanting to continue with individual therapy with established mental health therapist.  PT reported stressors with her health insurance, reporting she has been able to navigate it now.  PT continues to build on stress reducing strategies and reports she has been able to connect with her family and reflect on her thoughts and feelings.  PT reports she and her sister are planning a vacation together in the coming months.  PT encouraged to consider options for more balance of work and leisure into her routine.  PT reported she would be in group on Thursday.  I: Counselor facilitated 1:1 session.  A: PT appears motivated in her recovery and motivated to explore increased daily balance and self care.  P: PT to continue with Phase III of group, continue to work towards treatment plan goals, PT to schedule follow up appointments with medical providers as needed as well as follow up with established mental health therapist.  PT to meet with counselor as needed ongoing.    Kristi Camarillo, VANDANA, CGC

## 2022-05-19 ENCOUNTER — TELEPHONE (OUTPATIENT)
Dept: BEHAVIORAL HEALTH | Facility: CLINIC | Age: 47
End: 2022-05-19

## 2022-05-20 NOTE — TELEPHONE ENCOUNTER
Writer sent PT SECURE Email:    Ameya Dai,     I m reaching out to you as I didn t hear from you regarding group tonight (5/19/2022).  If you could please contact me, we could also schedule an individual session next week to check in.    Thank you!    Kristi Camarillo, [facility], [facility] [place] Counselor  [address] [address]  [email or web address]  Office: [phone]  Fax: [phone]  Gender pronouns:  she/her/hers  Employed by [employer]      Office hours:  Monday - Thursday.    Suicide Prevention Line: 1-754.802.7855  [facility] Mobile Crisis team: [phone]  [place] Crisis Text Line: Text  [place]  to 600547  Call 911 if experiencing an emergency    Adult Mental Health Crisis response phone numbers:  To find your county:  Adult mental health crisis response phone numbers / [place] Department of [place] ([place].gov[place]    The confidential information accompanying this transmission contains protected health information under state and federal law and is legally privileged.  This information is intended only for the use of the individual or entity named above and may be used only for carrying out treatment, payment or other healthcare operations.  The recipient or person responsible for delivering this information is prohibited by law from disclosing this information without proper authorization to any other party, unless required to do so by law or regulation.  If you are not the intended recipient, you are hereby notified that any review, dissemination, distribution or copying of this message is strictly prohibited.  If you have received this communication in error, please destroy the materials and contact us immediately by calling the department number listed above.  No response indicates that the information was received by the appropriate authorized party.

## 2022-06-14 ENCOUNTER — HOSPITAL ENCOUNTER (OUTPATIENT)
Dept: BEHAVIORAL HEALTH | Facility: CLINIC | Age: 47
Discharge: HOME | End: 2022-06-14
Attending: FAMILY MEDICINE | Admitting: FAMILY MEDICINE
Payer: COMMERCIAL

## 2022-06-14 PROCEDURE — 90834 PSYTX W PT 45 MINUTES: CPT | Mod: GT,95

## 2022-06-14 NOTE — PROGRESS NOTES
Individual counseling session:    Telemedicine Visit: The patient's condition can be safely assessed and treated via synchronous audio and visual telemedicine encounter.      Reason for Telemedicine Visit: Services only offered telehealth    Originating Site (Patient Location): Patient's home    Distant Site (Provider Location): Provider Remote Setting- Home Office    Consent:  The patient/guardian has verbally consented to: the potential risks and benefits of telemedicine (video visit) versus in person care; bill my insurance or make self-payment for services provided; and responsibility for payment of non-covered services.     Mode of Communication:  Video Conference via Breeze    As the provider I attest to compliance with applicable laws and regulations related to telemedicine.    Counselor verified Patient with 2-point verification: Patient is known to provider.    Video visit start time: 9:04 AM  Video visit end time: 9:54 AM    Length of session: 50 minutes    D: Writer met with PT for scheduled individual counseling session via video conference.  PT and counselor discussed progress and completion of programming.  PT reports work is going well and PT reports she continues to maintain positive attitude  PT reports reconnecting with her brother and her mother will be visiting this weekend from out of town.  PT reports her mental health as stable.  Writer and PT discussed aftercare planning, including engaging in individual sessions with a gambling treatment provider from the list from [facility] a therapist as a continuum of care, in addition to attending Gamblers Anonymous meetings.  PT will consider attending group on 6/16/2022 for group graduation.  PT shared feeling prideful for her progress and completion of programming.  Writer will send PT completion coin via [employer] Mail.  Writer to also resend updated INESSA's to PT via [employer] as well as continuing care recommendations for completion.    I: Counselor  facilitated 1:1 session.  A: PT appears engaged and motivated in her continued recovery efforts.  P: PT to complete Continuing Care Recommendations, Releases of Information through [employer].  PT to contact counselor on 6/16/2022 regarding attending group for group graduation.  PT to successfully complete programming on 6/16/2022.    Kristi Camarillo, [facility] Center[facility], CGC

## 2022-06-16 ENCOUNTER — HOSPITAL ENCOUNTER (OUTPATIENT)
Dept: BEHAVIORAL HEALTH | Facility: CLINIC | Age: 47
Discharge: HOME | End: 2022-06-16
Attending: FAMILY MEDICINE | Admitting: FAMILY MEDICINE
Payer: COMMERCIAL

## 2022-06-16 PROCEDURE — 90853 GROUP PSYCHOTHERAPY: CPT | Mod: GT,95

## 2022-06-17 NOTE — PROGRESS NOTES
Group Therapy Documentation     Was the patient seen in-person or via Telemedicine (if in-person skip to Group Note): Telemedicine    If Telemedicine: The patient's condition can be safely assessed and treated via synchronous audio and visual telemedicine encounter. ? ?      Reason for Telemedicine Visit: Patient has requested telehealth visit    Originating Site (Patient Location): Patient's home    Distant Site (Provider Location): Provider Remote Setting- Home Office    Consent: ?The patient/guardian has verbally consented to: the potential risks and benefits of telemedicine (video visit) versus in person care; bill my insurance or make self-payment for services provided; and responsibility for payment of non-covered services.       Mode of Communication:??Video Conference via Zoom      As the provider I attest to compliance with applicable laws and regulations related to telemedicine.        Patient attended a video group session on the following days: ?          GROUP NOTE:  DATE OF SERVICE:  June 16, 2022    START TIME:  5:30pm    END TIME:  7:15 pm    Group Length:   105 minutes    FACILITATOR(S):    VANDANA Lord, [facility] [place]    TOPIC: BEH Group Therapy, Problem Gambling Group     Number of patients attending the group: 3    Group Therapy Type:  Problem Gambling Group    Group Attendance:  Client attended group     Summary of Group / Topics Discussed: Group members began with check in and any recent events.  Group discussion was held on the topic of money.  Group discussed the emotional meaning of money and various aspects of they relationship with money.  Group discussed the value placed on money and shared what they learned about money at an early age and what has changed, both positive and negative.  Group members discussed how their relationship has changed with money while in their recovery.  Group ended with a successful completion/graduation of a group member.     Patient's response to the  group topic/interactions:  PT appeared to gain insight into the value placed on money and their ongoing relationship with money.       Client specific details: PT joined group at 5:55 as she sent counselor notice earlier in the day she would be joining group late.  PT shared in group discussion on money, reporting her relationship with money has positively changed.  PT graduated programming and shared her progress as well as received positive feedback from peer and counselor.     Writer to send individual providers and GA information to PT via Email as well as copy of completed continuing care plan.    Kristi Camarillo, VANDANA, CGC

## 2022-06-25 ENCOUNTER — HEALTH MAINTENANCE LETTER (OUTPATIENT)
Age: 47
End: 2022-06-25

## 2022-11-20 ENCOUNTER — HEALTH MAINTENANCE LETTER (OUTPATIENT)
Age: 47
End: 2022-11-20

## 2023-07-08 ENCOUNTER — HEALTH MAINTENANCE LETTER (OUTPATIENT)
Age: 48
End: 2023-07-08

## 2024-08-25 ENCOUNTER — HEALTH MAINTENANCE LETTER (OUTPATIENT)
Age: 49
End: 2024-08-25

## 2024-11-03 ASSESSMENT — PATIENT HEALTH QUESTIONNAIRE - PHQ9: SUM OF ALL RESPONSES TO PHQ QUESTIONS 1-9: 16

## 2025-07-12 ENCOUNTER — HEALTH MAINTENANCE LETTER (OUTPATIENT)
Age: 50
End: 2025-07-12